# Patient Record
Sex: MALE | Race: WHITE | NOT HISPANIC OR LATINO | Employment: FULL TIME | URBAN - METROPOLITAN AREA
[De-identification: names, ages, dates, MRNs, and addresses within clinical notes are randomized per-mention and may not be internally consistent; named-entity substitution may affect disease eponyms.]

---

## 2017-02-20 ENCOUNTER — APPOINTMENT (OUTPATIENT)
Dept: PHYSICAL THERAPY | Facility: CLINIC | Age: 37
End: 2017-02-20
Payer: COMMERCIAL

## 2017-02-21 ENCOUNTER — APPOINTMENT (OUTPATIENT)
Dept: PHYSICAL THERAPY | Facility: CLINIC | Age: 37
End: 2017-02-21
Payer: COMMERCIAL

## 2017-02-21 PROCEDURE — 97140 MANUAL THERAPY 1/> REGIONS: CPT

## 2017-02-21 PROCEDURE — 97110 THERAPEUTIC EXERCISES: CPT

## 2017-02-21 PROCEDURE — 97161 PT EVAL LOW COMPLEX 20 MIN: CPT

## 2017-02-24 ENCOUNTER — APPOINTMENT (OUTPATIENT)
Dept: PHYSICAL THERAPY | Facility: CLINIC | Age: 37
End: 2017-02-24
Payer: COMMERCIAL

## 2017-02-27 ENCOUNTER — APPOINTMENT (OUTPATIENT)
Dept: PHYSICAL THERAPY | Facility: CLINIC | Age: 37
End: 2017-02-27
Payer: COMMERCIAL

## 2017-02-27 PROCEDURE — 97110 THERAPEUTIC EXERCISES: CPT

## 2017-02-27 PROCEDURE — 97140 MANUAL THERAPY 1/> REGIONS: CPT

## 2017-03-02 ENCOUNTER — APPOINTMENT (OUTPATIENT)
Dept: PHYSICAL THERAPY | Facility: CLINIC | Age: 37
End: 2017-03-02
Payer: COMMERCIAL

## 2017-03-02 PROCEDURE — 97140 MANUAL THERAPY 1/> REGIONS: CPT

## 2017-03-02 PROCEDURE — 97110 THERAPEUTIC EXERCISES: CPT

## 2017-03-07 ENCOUNTER — APPOINTMENT (OUTPATIENT)
Dept: PHYSICAL THERAPY | Facility: CLINIC | Age: 37
End: 2017-03-07
Payer: COMMERCIAL

## 2017-03-07 PROCEDURE — 97110 THERAPEUTIC EXERCISES: CPT

## 2017-03-07 PROCEDURE — 97140 MANUAL THERAPY 1/> REGIONS: CPT

## 2017-03-09 ENCOUNTER — APPOINTMENT (OUTPATIENT)
Dept: PHYSICAL THERAPY | Facility: CLINIC | Age: 37
End: 2017-03-09
Payer: COMMERCIAL

## 2017-03-09 PROCEDURE — 97110 THERAPEUTIC EXERCISES: CPT

## 2017-03-09 PROCEDURE — 97140 MANUAL THERAPY 1/> REGIONS: CPT

## 2017-03-14 ENCOUNTER — APPOINTMENT (OUTPATIENT)
Dept: PHYSICAL THERAPY | Facility: CLINIC | Age: 37
End: 2017-03-14
Payer: COMMERCIAL

## 2017-03-16 ENCOUNTER — GENERIC CONVERSION - ENCOUNTER (OUTPATIENT)
Dept: OTHER | Facility: OTHER | Age: 37
End: 2017-03-16

## 2017-03-16 ENCOUNTER — APPOINTMENT (OUTPATIENT)
Dept: PHYSICAL THERAPY | Facility: CLINIC | Age: 37
End: 2017-03-16
Payer: COMMERCIAL

## 2017-03-16 PROCEDURE — 97140 MANUAL THERAPY 1/> REGIONS: CPT

## 2017-03-16 PROCEDURE — 97110 THERAPEUTIC EXERCISES: CPT

## 2017-03-21 ENCOUNTER — APPOINTMENT (OUTPATIENT)
Dept: PHYSICAL THERAPY | Facility: CLINIC | Age: 37
End: 2017-03-21
Payer: COMMERCIAL

## 2017-03-21 PROCEDURE — 97110 THERAPEUTIC EXERCISES: CPT

## 2017-03-21 PROCEDURE — 97140 MANUAL THERAPY 1/> REGIONS: CPT

## 2017-03-21 PROCEDURE — 97010 HOT OR COLD PACKS THERAPY: CPT

## 2017-03-23 ENCOUNTER — APPOINTMENT (OUTPATIENT)
Dept: PHYSICAL THERAPY | Facility: CLINIC | Age: 37
End: 2017-03-23
Payer: COMMERCIAL

## 2017-03-23 PROCEDURE — 97140 MANUAL THERAPY 1/> REGIONS: CPT

## 2017-03-23 PROCEDURE — 97110 THERAPEUTIC EXERCISES: CPT

## 2017-03-28 ENCOUNTER — APPOINTMENT (OUTPATIENT)
Dept: PHYSICAL THERAPY | Facility: CLINIC | Age: 37
End: 2017-03-28
Payer: COMMERCIAL

## 2017-03-28 PROCEDURE — 97140 MANUAL THERAPY 1/> REGIONS: CPT

## 2017-03-28 PROCEDURE — 97110 THERAPEUTIC EXERCISES: CPT

## 2017-03-30 ENCOUNTER — APPOINTMENT (OUTPATIENT)
Dept: PHYSICAL THERAPY | Facility: CLINIC | Age: 37
End: 2017-03-30
Payer: COMMERCIAL

## 2017-04-21 ENCOUNTER — ALLSCRIPTS OFFICE VISIT (OUTPATIENT)
Dept: OTHER | Facility: OTHER | Age: 37
End: 2017-04-21

## 2017-04-21 DIAGNOSIS — G57.82 OTHER SPECIFIED MONONEUROPATHIES OF LEFT LOWER LIMB: ICD-10-CM

## 2018-01-15 NOTE — PROGRESS NOTES
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)    Discussion/Summary    Continue home stretches indefinitely especially before and after running  Chief Complaint  Consult from Neurosurgery for left leg pain  9/22 see all subsequent HPIs for further subjective info      History of Present Illness  29 yo male with chronic, waxing and waning pain left medial knee/thigh to below knee  Present for several years   no known trauma   seen by Hilbert Boast and went to PT without benefit   has also tried chiropractor w/o lasting benefit  Noted mostly at night or when recumbent  Does have some LBP, left sciatic notch area pain  Sharp at times, Running has no effect   warm shower helps  Last night sat for three hours watching TV w/o pain  No bowel or bladder Sx  No N/T in toes  Had x-rays   never an MRI    9/22 in PT here, seem to be improving, no pain, transient numbness a few weeks ago      Review of Systems    Other Symptoms: no changes  ROS reviewed  Active Problems    1  Ankle contracture, left (718 47) (M24 572)   2  Left leg pain (729 5) (M79 605)   3  Lower back pain (724 2) (M54 5)   4  Other intervertebral disc degeneration, lumbar region (722 52) (M51 36)   5  Saphenous nerve neuropathy, 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)   6  Unequal leg length (acquired) (736 81) (M21 70)   7  URTI (acute upper respiratory infection) (465 9) (J06 9)    Past Medical History    1  History of Onychomycosis of toenail (110 1) (B35 1)    The active problems and past medical history were reviewed and updated today  Surgical History    The surgical history was reviewed and updated today  Family History  Mother    1  Family history of pancreatic cancer (V16 0) (Z80 0)  Father    2  Family history of aortic valve disorder (V17 49) (Z82 49)  Family History    3  Family history of hypertension (V17 49) (Z82 49)    The family history was reviewed and updated today         Social History    · [employer] graduate   · Denied: History of Drug use   · Lives with spouse   · Never a smoker   · Occasional alcohol use  The social history was reviewed and is unchanged  Current Meds   1  Advil 200 MG Oral Capsule; Therapy: (Recorded:51Tup7068) to Recorded    The medication list was reviewed and updated today  Allergies    1  Penicillins   2  sulfa    Vitals  Signs   Recorded: 83LVP0407 53:65YO   Systolic: 164, LUE, Sitting  Diastolic: 70, LUE, Sitting  Heart Rate: 80, L Radial  Pulse Quality: Regular, L Radial  Respiration Quality: Normal  Respiration: 16  Height: 6 ft   Weight: 211 lb 6 oz  BMI Calculated: 28 67  BSA Calculated: 2 18    Physical Exam        Lumbar/Sacral Spine examination demonstrates Lumbosacral Spine:   Evaluation of Muscle Stretch Reflexes on the right side demonstrates 2/4 Hamstring Reflex, 2/4 Knee Jerk Reflex, 2/4 Ankle Jerk Reflex, negative Wilhelm Test right upper extremity and negative right ankle clonus  Evaluation of Muscle Stretch Reflexes on the left side demonstrates negative left ankle clonus, but 2/4 Hamstring Reflex, 2/4 Knee Jerk Reflex, 2/4 Ankle Jerk Reflex and negative Wilhelm Test left upper extremity  Special Tests: negative Straight Leg Raise on right and negative Straight Leg Raise on left  Results/Data    Diagnostic Review PT re-eval from 9/20 noted        Signatures   Electronically signed by : Mike Klein DO; Sep 22 2016  4:29PM EST                       (Author)

## 2018-01-16 NOTE — RESULT NOTES
Verified Results  * CT LEG LENGTH EVALUATION (SCANOGRAM) 12Aug2016 06:10AM Sulma Tavarez    Order Number: [id number]    Order Number: [id number]     Test Name Result Flag Reference   CT LEG LENGTH EVALUATION (SCANOGRAM) (Report)     LEG LENGTH STUDY     INDICATION: Unequal leg length  Stabbing pain in the left thigh when laying down  Evaluate leg lengths  VIEWS: A noncontrast supine CT scanogram was performed  COMPARISON: None  FINDINGS:       Bone length measurements are as follows:     Right femur from femoral head to medial femoral condyle = 49 5 cm      Left femur from femoral head to medial femoral condyle = 49 3 cm     Right tibia from medial tibial plateau the tibial plafond = 38 9 cm      Left tibia from medial tibial plateau the tibial plafond = 39 1 cm  Total right leg length including knee joint space from femoral head to tibial plafond = 89 1 cm  Total left leg length including knee joint space from femoral head to tibial plafond = 88 6 cm  IMPRESSION:     1  Leg length discrepancy measurements as above  Workstation performed: [id number]     Signed by:   Muna Parker MD   8/12/16     * MRI LUMBAR SPINE WO CONTRAST [id number] 06:08AM Anjali Dominguez Order Number: [id number]   Performing Comments: Has had out pt PT extensively and does self directed home program, had chiropractor    - Patient Instructions: To schedule this appointment, please contact Central Scheduling at [phone]   Order Number: [id number]   Performing Comments: Has had out pt PT extensively and does self directed home program, had chiropractor    - Patient Instructions: To be done on Friday, July 22, 6;30 a m  at [address]  1   Please arrive 15 minutes early to register  2   PLEASE BRING THIS ORDER WITH YOU, along wtih your insurance cards, photo ID and list of medications  3   No metal or jewelry       Test Name Result Flag Reference   MRI LUMBAR SPINE WO CONTRAST (Report)     This is a summary report  The complete report is available in the patient's medical record  If you cannot access the medical record, please contact the sending organization for a detailed fax or copy  MRI LUMBAR SPINE WITHOUT CONTRAST     INDICATION: Low back pain with left leg radiculopathy  COMPARISON: None  TECHNIQUE: Sagittal T1, sagittal T2, sagittal inversion recovery, axial T1 and axial T2, coronal T2       IMAGE QUALITY: Diagnostic     FINDINGS:     OSSEOUS STRUCTURES: Normal alignment of the lumbar spine  No spondylolysis or spondylolisthesis  No scoliosis  There is no compression fracture  Type II endplate marrow degenerative change involving the L5-S1 endplates  SACRUM: Normal signal within the sacrum  No evidence of insufficiency or stress fracture  DISTAL CORD AND CONUS: Normal size and signal within the distal cord and conus  The conus ends at the L1 level  PARASPINAL SOFT TISSUES: Paraspinal soft tissues are unremarkable  LOWER THORACIC DISC SPACES: Normal disc height and signal  No disc herniation, canal stenosis or foraminal narrowing  LUMBAR DISC SPACES:        L1-L2: Normal      L2-L3: Normal      L3-L4: Normal disc height and signal  Mild foraminal narrowing which may be related to congenital short pedicles  No discrete foraminal nerve impingement  L4-L5: Normal disc height and signal  No disc herniation, canal stenosis or foraminal narrowing  L5-S1: Disc desiccation and loss of disc height  Mild diffuse annular bulging without focal disc herniation  No canal stenosis  IMPRESSION:     No disc herniation or canal stenosis  Mild foraminal narrowing without discrete nerve impingement at the L3-4 level         Workstation performed: [id number]     Signed by:   Ruben Phan DO   8/12/16

## 2018-01-16 NOTE — PROGRESS NOTES
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)    Plan  Saphenous nerve neuropathy, left    · * MRI FEMUR RIGHT W WO CONTRAST; Status:Need Information - Financial  Authorization; Requested for:21Apr2017;    · Follow Up After Tests Complete Evaluation and Treatment  Follow-up  Status: Hold For -  Scheduling  Requested for: 21Apr2017    Discussion/Summary    MRI left upper leg  Follow up after MRI  Chief Complaint  Consult from Neurosurgery for left leg pain  4/21 self scheduled follow up  History of Present Illness  31 yo male with chronic, waxing and waning pain left medial knee/thigh to below knee  Present for several years   no known trauma   seen by Sammie Wright and went to PT without benefit   has also tried chiropractor w/o lasting benefit  Noted mostly at night or when recumbent  Does have some LBP, left sciatic notch area pain  Sharp at times, Running has no effect   warm shower helps  Last night sat for three hours watching TV w/o pain  No bowel or bladder Sx  No N/T in toes  Had x-rays   never an MRI    4/21 last Seen Sept 2016 had been doing well  Had completely resolved   lasted for a few months   now returned, pain and sensory disturbance on left medial thigh, NOTHING below knee  No back pain or radicular Sx  Review of Systems    Gastrointestinal: No complaints of abdominal pain, no constipation, no nausea or vomiting, no diarrhea or bloody stools  Genitourinary: No complaints of dysuria or incontinence, no hesitancy, no nocturia  Musculoskeletal: as noted in HPI  Neurological: no numbness and no tingling  Active Problems    1  Ankle contracture, left (718 47) (M24 572)   2  Bronchitis (490) (J40)   3  Heart burn (787 1) (R12)   4  Influenza (487 1) (J11 1)   5  Left leg pain (729 5) (M79 605)   6  Lower back pain (724 2) (M54 5)   7  Nausea (787 02) (R11 0)   8  Other intervertebral disc degeneration, lumbar region (722 52) (M51 36)   9   Saphenous nerve neuropathy, 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)   10  Unequal leg length (acquired) (736 81) (M21 70)   11  URTI (acute upper respiratory infection) (465 9) (J06 9)    Past Medical History    1  History of Onychomycosis of toenail (110 1) (B35 1)   2  History of URTI (acute upper respiratory infection) (465 9) (J06 9)    The active problems and past medical history were reviewed and updated today  Surgical History    The surgical history was reviewed and updated today  Family History  Mother    1  Family history of pancreatic cancer (V16 0) (Z80 0)  Father    2  Family history of aortic valve disorder (V17 49) (Z82 49)  Family History    3  Family history of hypertension (V17 49) (Z82 49)    Social History    · College graduate   · Denied: History of Drug use   · Lives with spouse   · Never a smoker   · Occasional alcohol use    Current Meds   1  Advil 200 MG Oral Capsule; Therapy: (Recorded:60Kxm7718) to Recorded   2  Benzonatate 100 MG Oral Capsule; 1-2 pills every eight hours for cough; Therapy: 07NLT3744 to (Evaluate:25Jan2017)  Requested for: 87OYH5227; Last   Rx:05Jan2017 Ordered   3  Pantoprazole Sodium 40 MG Oral Tablet Delayed Release; TAKE 1 TABLET Daily prior to   breakfast daily; Therapy: 16YCO4743 to (Evaluate:68Ivt6554)  Requested for: 68IIY3935; Last   Rx:04Jan2017 Ordered   4  Prochlorperazine Maleate 10 MG Oral Tablet; TAKE 1 TABLET EVERY 6 HOURS AS   NEEDED FOR NAUSEA; Therapy: 32XQU8713 to (Evaluate:10Jan2017)  Requested for: 24URV1419; Last   Rx:04Jan2017 Ordered   5  Zithromax Z-Brody 250 MG Oral Tablet (Azithromycin); TAKE AS DIRECTED; Therapy: 81DGB9417 to (Evaluate:10Jan2017)  Requested for: 91TRB0128; Last   Rx:05Jan2017 Ordered    Allergies    1   Penicillins   2  sulfa    Vitals  Signs   Recorded: 21Apr2017 02:59PM   Heart Rate: 84  Systolic: 815  Diastolic: 90  Height: 6 ft   Weight: 225 lb 6 08 oz  BMI Calculated: 30 57  BSA Calculated: 2 24    Results/Data    MRI Review L spine is normal       Signatures   Electronically signed by : Blanca Maravlila DO;  Apr 21 2017  3:19PM EST                       (Author)

## 2018-01-18 NOTE — PROGRESS NOTES
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)    Plan  Saphenous nerve neuropathy, left    · *1 - [facility]  Consult [facility] MFR nerve  glides for intermittent left Saphenous entrpment in thigh  Status: Hold For - Scheduling   Requested for: 96IMY2131  Care Summary provided  : Yes   · Follow-up Visit in 4 Weeks Evaluation and Treatment  Follow-up  Status: Hold For -  Scheduling  Requested for: 56Ssv9685    Discussion/Summary    Nerve pinching seems to be more in the leg than from the spine  Chief Complaint  Consult from Neurosurgery for left leg pain  History of Present Illness  29 yo male with chronic, waxing and waning pain left medial knee/thigh to below knee  Present for several years   no known trauma   seen by Kadi Ness and went to PT without benefit   has also tried chiropractor w/o lasting benefit  Noted mostly at night or when recumbent  Does have some LBP, left sciatic notch area pain  Sharp at times, Running has no effect   warm shower helps  Last night sat for three hours watching TV w/o pain  No bowel or bladder Sx  No N/T in toes  Had x-rays   never an MRI  8/18 pattern unchanged left medial knee to no lower than calf   NOT toes  Sx intermittent, runs w/o problems, then can occur with sitting  Review of Systems    Musculoskeletal: as noted in HPI  Active Problems    1  Ankle contracture, left (718 47) (M24 572)   2  Left leg pain (729 5) (M79 605)   3  Lower back pain (724 2) (M54 5)   4  Other intervertebral disc degeneration, lumbar region (722 52) (M51 36)   5  Unequal leg length (acquired) (736 81) (M21 70)   6  URTI (acute upper respiratory infection) (465 9) (J06 9)    Past Medical History    1  History of Onychomycosis of toenail (110 1) (B35 1)    The active problems and past medical history were reviewed and updated today  Surgical History    The surgical history was reviewed and updated today  Family History  Mother    1  Family history of pancreatic cancer (V16 0) (Z80 0)  Father    2  Family history of aortic valve disorder (V17 49) (Z82 49)  Family History    3  Family history of hypertension (V17 49) (Z82 49)    Social History    · College graduate   · Denied: History of Drug use   · Lives with spouse   · Never a smoker   · Occasional alcohol use    Current Meds   1  Advil 200 MG Oral Capsule; Therapy: (Recorded:14Wxp3590) to Recorded    The medication list was reviewed and updated today  Allergies    1  Penicillins   2  sulfa    Vitals  Signs   Recorded: 71ANY8852 26:73YB   Systolic: 647  Diastolic: 82  Heart Rate: 80  Weight: 214 lb 6 oz  BMI Calculated: 29 07  BSA Calculated: 2 19    Physical Exam    Constitutional   General appearance: Well developed, well nourished, alert, in no distress, non-toxic and no overt pain behavior  Lumbar/Sacral Spine examination demonstrates  neurologically intact, ++ Tinel'sover Saphenous nerve at medial knees joint  Results/Data  I personally reviewed the films/images/results in the office today  My interpretation follows  MRI Review L- 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

## 2018-01-22 VITALS
HEIGHT: 72 IN | WEIGHT: 225.38 LBS | HEART RATE: 84 BPM | BODY MASS INDEX: 30.53 KG/M2 | SYSTOLIC BLOOD PRESSURE: 142 MMHG | DIASTOLIC BLOOD PRESSURE: 90 MMHG

## 2018-03-13 ENCOUNTER — TELEPHONE (OUTPATIENT)
Dept: INTERNAL MEDICINE CLINIC | Facility: CLINIC | Age: 38
End: 2018-03-13

## 2018-03-13 DIAGNOSIS — E78.5 HYPERLIPIDEMIA, UNSPECIFIED HYPERLIPIDEMIA TYPE: Primary | ICD-10-CM

## 2018-03-13 DIAGNOSIS — R53.83 FATIGUE, UNSPECIFIED TYPE: ICD-10-CM

## 2018-03-13 NOTE — TELEPHONE ENCOUNTER
Pt called to schedule a physical on 4/11  Would you please order the required Bw  He will probably end up going to [facility] to get it done  I will call him at [phone] once the labs are in  Thank you

## 2018-03-21 ENCOUNTER — APPOINTMENT (OUTPATIENT)
Dept: LAB | Facility: CLINIC | Age: 38
End: 2018-03-21
Payer: COMMERCIAL

## 2018-03-21 ENCOUNTER — TRANSCRIBE ORDERS (OUTPATIENT)
Dept: LAB | Facility: CLINIC | Age: 38
End: 2018-03-21

## 2018-03-21 DIAGNOSIS — R53.83 FATIGUE, UNSPECIFIED TYPE: ICD-10-CM

## 2018-03-21 DIAGNOSIS — E78.5 HYPERLIPIDEMIA, UNSPECIFIED HYPERLIPIDEMIA TYPE: ICD-10-CM

## 2018-03-21 LAB
ALBUMIN SERPL BCP-MCNC: 4.1 G/DL (ref 3.5–5)
ALP SERPL-CCNC: 76 U/L (ref 46–116)
ALT SERPL W P-5'-P-CCNC: 49 U/L (ref 12–78)
ANION GAP SERPL CALCULATED.3IONS-SCNC: 6 MMOL/L (ref 4–13)
AST SERPL W P-5'-P-CCNC: 27 U/L (ref 5–45)
BILIRUB SERPL-MCNC: 0.6 MG/DL (ref 0.2–1)
BUN SERPL-MCNC: 15 MG/DL (ref 5–25)
CALCIUM SERPL-MCNC: 9.4 MG/DL (ref 8.3–10.1)
CHLORIDE SERPL-SCNC: 103 MMOL/L (ref 100–108)
CHOLEST SERPL-MCNC: 175 MG/DL (ref 50–200)
CO2 SERPL-SCNC: 33 MMOL/L (ref 21–32)
CREAT SERPL-MCNC: 1.3 MG/DL (ref 0.6–1.3)
GFR SERPL CREATININE-BSD FRML MDRD: 70 ML/MIN/1.73SQ M
GLUCOSE P FAST SERPL-MCNC: 93 MG/DL (ref 65–99)
HDLC SERPL-MCNC: 45 MG/DL (ref 40–60)
LDLC SERPL CALC-MCNC: 107 MG/DL (ref 0–100)
POTASSIUM SERPL-SCNC: 4 MMOL/L (ref 3.5–5.3)
PROT SERPL-MCNC: 7.6 G/DL (ref 6.4–8.2)
SODIUM SERPL-SCNC: 142 MMOL/L (ref 136–145)
TRIGL SERPL-MCNC: 114 MG/DL

## 2018-03-21 PROCEDURE — 80053 COMPREHEN METABOLIC PANEL: CPT

## 2018-03-21 PROCEDURE — 36415 COLL VENOUS BLD VENIPUNCTURE: CPT

## 2018-03-21 PROCEDURE — 80061 LIPID PANEL: CPT

## 2018-03-26 ENCOUNTER — OFFICE VISIT (OUTPATIENT)
Dept: INTERNAL MEDICINE CLINIC | Facility: CLINIC | Age: 38
End: 2018-03-26

## 2018-03-26 DIAGNOSIS — M62.838 MUSCLE SPASM OF RIGHT LEG: Primary | ICD-10-CM

## 2018-03-26 PROCEDURE — 99213 OFFICE O/P EST LOW 20 MIN: CPT | Performed by: NURSE PRACTITIONER

## 2018-03-26 NOTE — ASSESSMENT & PLAN NOTE
Pain appears to be musculoskeletal in nature, possible muscle spasm and or muscle strain  I suggested NSAIDS and application of heat and I prescribed flexeril x 10 days  We discussed ways he can begin with gentle stretching as tolerated  I explained that if his symptoms do not resolve he will need additional follow up  We discussed possible need for PT as well as injury prevention while running

## 2018-03-26 NOTE — PATIENT INSTRUCTIONS
Muscle Spasm   WHAT YOU NEED TO KNOW:   A muscle spasm is a sudden contraction of any muscle or group of muscles  A muscle cramp is a painful muscle spasm  Muscle cramps commonly occur after intense exercise or during pregnancy  They may also be caused by certain medications, dehydration, low calcium or magnesium levels, or another medical condition  DISCHARGE INSTRUCTIONS:   Medicines: You may need the following:  · NSAIDs  help decrease swelling and pain or fever  This medicine is available with or without a doctor's order  NSAIDs can cause stomach bleeding or kidney problems in certain people  If you take blood thinner medicine, always ask your healthcare provider if NSAIDs are safe for you  Always read the medicine label and follow directions  · Take your medicine as directed  Contact your healthcare provider if you think your medicine is not helping or if you have side effects  Tell him of her if you are allergic to any medicine  Keep a list of the medicines, vitamins, and herbs you take  Include the amounts, and when and why you take them  Bring the list or the pill bottles to follow-up visits  Carry your medicine list with you in case of an emergency  Follow up with your healthcare provider as directed: You may need other tests or treatment  You may also be referred to a physical therapist or other specialist  Write down your questions so you remember to ask them during your visits  Self-care:   · Stretch  your muscle to help relieve the cramp  It may be helpful to keep your muscle in the stretched position until the cramp is gone  · Apply heat  to help decrease pain and muscle spasms  Apply heat on the area for 20 to 30 minutes every 2 hours for as many days as directed  · Apply ice  to help decrease swelling and pain  Ice may also help prevent tissue damage  Use an ice pack, or put crushed ice in a plastic bag   Cover it with a towel and place it on your muscle for 15 to 20 minutes every hour or as directed  · Drink more liquids  to help prevent muscle cramps caused by dehydration  Sports drinks may help replace electrolytes you lose through sweat during exercise  Ask your healthcare provider how much liquid to drink each day and which liquids are best for you  · Eat healthy foods , such as fruits, vegetables, whole grains, low-fat dairy products, and lean proteins (meat, beans, and fish)  If you are pregnant, ask your healthcare provider about foods that are high in magnesium and sodium  They may help to relieve cramps during pregnancy  · Massage your muscle  to help relieve the cramp  · Take frequent deep breaths  until the cramp feels better  Lie down while you take the deep breaths so you do not get dizzy or lightheaded  Contact your healthcare provider if:   · You have signs of dehydration, such as a headache, dark yellow urine, dry eyes or mouth, or a fast heartbeat  · You have questions or concerns about your condition or care  Return to the emergency department if:   · You have warmth, swelling, or redness in the cramping muscle  · You have frequent or unrelieved muscle cramps in several different muscles  · You have muscle cramps with numbness, tingling, and burning in your hands and feet  © 2017 [address] Information is for End User's use only and may not be sold, redistributed or otherwise used for commercial purposes  All illustrations and images included in CareNotes® are the copyrighted property of [employer] , [employer]  or Addy Acuña  The above information is an  only  It is not intended as medical advice for individual conditions or treatments  Talk to your doctor, nurse or pharmacist before following any medical regimen to see if it is safe and effective for you

## 2018-03-26 NOTE — PROGRESS NOTES
Assessment/Plan:    Muscle spasm of right leg  Pain appears to be musculoskeletal in nature, possible muscle spasm and or muscle strain  I suggested NSAIDS and application of heat and I prescribed flexeril x 10 days  We discussed ways he can begin with gentle stretching as tolerated  I explained that if his symptoms do not resolve he will need additional follow up  We discussed possible need for PT as well as injury prevention while running  Diagnoses and all orders for this visit:    Muscle spasm of right leg          Subjective:      Patient ID: Isaac Alvarez is a 40 y o  male  Pt is a 40 y o  y/o male who is seen today for evaluation of a 1 week history of R thigh pain  Pt reports a localized "tightness" on the posterior/lateral R thigh which is worsened with weightbearing and straightening of his leg  Pt states that prior to start of symptoms he ran two miles  He does run for exercise, however he had not run in recent weeks  Pain started initially in his lower back while on the floor playing with his daughter; the back pain is no longer present and symptoms are very localized to the muscles of the thigh  There is no numbness or tingling, no radiation from the hip down to the lower leg  He did try taking a pain pill that was prescribed to his wife, he does not remember the name of this medication but says it did not relieve his symptoms  The following portions of the patient's history were reviewed and updated as appropriate: allergies, current medications, past family history, past medical history, past social history, past surgical history and problem list     Review of Systems   Constitutional: Positive for activity change  Negative for appetite change, chills, fatigue and fever  Respiratory: Negative for shortness of breath  Cardiovascular: Negative for chest pain, palpitations and leg swelling  Musculoskeletal: Positive for myalgias   Negative for arthralgias, back pain, joint swelling and neck pain  Skin: Negative for color change, rash and wound  Neurological: Negative for weakness and numbness  Objective: There were no vitals taken for this visit  Physical Exam   Constitutional: He is oriented to person, place, and time  He appears well-developed and well-nourished  He is cooperative  Neck: Normal range of motion  Cardiovascular: Normal rate, regular rhythm and intact distal pulses  Pulmonary/Chest: Effort normal    Musculoskeletal: Normal range of motion  He exhibits no edema  Right hip: He exhibits tenderness  Right upper leg: He exhibits tenderness  There is significant tightness noted on exam on the R lateral thigh, upper- mid shaft and tenderness to palpation over this same area as well as posteriorly at the upper thigh  Pain is worst when leg is straightened in a supine position regardless of leg height  Flexion of knee relieves pain, rotation of hip is asymptomatic  Neurological: He is alert and oriented to person, place, and time  He has normal strength and normal reflexes  No sensory deficit  He exhibits normal muscle tone  Strength is normal, but pain limits motion   Skin: Skin is warm and dry  No rash noted  No erythema  Psychiatric: He has a normal mood and affect   His speech is normal and behavior is normal  Judgment and thought content normal  Cognition and memory are normal

## 2018-04-11 ENCOUNTER — OFFICE VISIT (OUTPATIENT)
Dept: INTERNAL MEDICINE CLINIC | Facility: CLINIC | Age: 38
End: 2018-04-11
Payer: COMMERCIAL

## 2018-04-11 VITALS
HEART RATE: 83 BPM | TEMPERATURE: 97.7 F | DIASTOLIC BLOOD PRESSURE: 82 MMHG | OXYGEN SATURATION: 99 % | WEIGHT: 220.4 LBS | RESPIRATION RATE: 12 BRPM | HEIGHT: 71 IN | SYSTOLIC BLOOD PRESSURE: 132 MMHG | BODY MASS INDEX: 30.85 KG/M2

## 2018-04-11 DIAGNOSIS — R12 HEART BURN: ICD-10-CM

## 2018-04-11 DIAGNOSIS — R68.82 LIBIDO, DECREASED: ICD-10-CM

## 2018-04-11 DIAGNOSIS — E78.5 HYPERLIPIDEMIA, UNSPECIFIED HYPERLIPIDEMIA TYPE: Primary | ICD-10-CM

## 2018-04-11 DIAGNOSIS — E66.9 OBESITY (BMI 30-39.9): ICD-10-CM

## 2018-04-11 PROBLEM — M62.838 MUSCLE SPASM OF RIGHT LEG: Status: RESOLVED | Noted: 2018-03-26 | Resolved: 2018-04-11

## 2018-04-11 PROCEDURE — 99395 PREV VISIT EST AGE 18-39: CPT | Performed by: INTERNAL MEDICINE

## 2018-04-11 RX ORDER — DIPHENOXYLATE HYDROCHLORIDE AND ATROPINE SULFATE 2.5; .025 MG/1; MG/1
1 TABLET ORAL DAILY
COMMUNITY

## 2018-04-11 RX ORDER — UBIDECARENONE 75 MG
CAPSULE ORAL DAILY
COMMUNITY

## 2018-04-11 NOTE — ASSESSMENT & PLAN NOTE
Prior history of heartburn symptoms which now are much better  He indicates that he does on a very rare occasion get some heartburn but is far less often than it use to be

## 2018-04-11 NOTE — PROGRESS NOTES
Assessment/Plan:    Hyperlipidemia  Reviewed with the patient his lipid profile which shows of mild elevation in his LDL with normal total cholesterol HDL and triglyceride values  I reviewed with the patient dietary adjustments which would be beneficial to help reduce his LDL burden  In addition I have recommended that he work on reducing his weight and start a regular exercise program   Would like to review his lipid profile in 1 year  Heart burn  Prior history of heartburn symptoms which now are much better  He indicates that he does on a very rare occasion get some heartburn but is far less often than it use to be  Libido, decreased  Patient inquired about possible causes for decreased libido  I indicated I think most likely the reason for his decreased libido his due to his very busy work schedule and a lack of time for relaxation  He is able to obtain erections without any problems  Patient was reassured that does not appear to be any physiologic problems  Obesity (BMI 30-39  9)  Body mass index today is 30 74  Reviewed with the patient the benefits of weight reduction  I have asked him to decrease his consumption of fried foods as well as maintain a regular exercise routine with exercise at least 4 days a week  Diagnoses and all orders for this visit:    Hyperlipidemia, unspecified hyperlipidemia type  -     Comprehensive metabolic panel; Future  -     Lipid panel; Future    Libido, decreased    Heart burn    Other orders  -     multivitamin (THERAGRAN) TABS; Take 1 tablet by mouth daily  -     cyanocobalamin (VITAMIN B-12) 100 mcg tablet; Take by mouth daily        Subjective:      Patient ID: Eber Noguera is a 40 y o  male  This is a routine annual health maintenance checkup for this 64-year-old gentleman  He presents today for physical exam and review of routine blood work    He was recently seen in our office for discomfort in his right leg treated with a muscle relaxer which he responded well to  His symptoms of discomfort and spasm have resolved completely  The patient indicates that he has started exercising on a regular basis recently is very busy at work and has had a decrease in his sexual libido over the past year  The following portions of the patient's history were reviewed and updated as appropriate:   He  has a past medical history of Onychomycosis of toenail  He   Patient Active Problem List    Diagnosis Date Noted    Hyperlipidemia 04/11/2018    Libido, decreased 04/11/2018    Obesity (BMI 30-39 9) 04/11/2018    Heart burn 01/04/2017     He  has no past surgical history on file  His family history includes Hypertension in his family; Other in his father; Pancreatic cancer in his mother  He  reports that he has never smoked  He has never used smokeless tobacco  He reports that he drinks alcohol  He reports that he does not use drugs       Review of Systems   All other systems reviewed and are negative  Objective:      /82 (BP Location: Left arm, Patient Position: Sitting, Cuff Size: Adult)   Pulse 83   Temp 97 7 °F (36 5 °C) (Tympanic)   Resp 12   Ht 5' 11" (1 803 m)   Wt 100 kg (220 lb 6 4 oz)   SpO2 99%   BMI 30 74 kg/m²          Physical Exam   Constitutional: He is oriented to person, place, and time  He appears well-developed and well-nourished  No distress  HENT:   Head: Normocephalic and atraumatic  Right Ear: Hearing, tympanic membrane, external ear and ear canal normal    Left Ear: Hearing, tympanic membrane, external ear and ear canal normal    Nose: Nose normal    Mouth/Throat: Oropharynx is clear and moist and mucous membranes are normal  No oropharyngeal exudate  Eyes: Conjunctivae are normal  Pupils are equal, round, and reactive to light  Right eye exhibits no discharge  Left eye exhibits no discharge  No scleral icterus  Neck: No JVD present  No tracheal deviation present  No thyromegaly present  Cardiovascular: Normal rate, regular rhythm, S1 normal, S2 normal, normal heart sounds and intact distal pulses  Exam reveals no gallop and no friction rub  No murmur heard  Pulmonary/Chest: Effort normal and breath sounds normal  No respiratory distress  He has no wheezes  He has no rales  He exhibits no tenderness  Abdominal: Soft  Bowel sounds are normal  He exhibits no distension and no mass  There is no tenderness  There is no rebound and no guarding  Musculoskeletal: Normal range of motion  He exhibits no edema  Lymphadenopathy:     He has no cervical adenopathy  Neurological: He is alert and oriented to person, place, and time  He has normal reflexes  He displays normal reflexes  No cranial nerve deficit  He exhibits normal muscle tone  Coordination normal    Skin: Skin is warm and dry  No rash noted  He is not diaphoretic  No erythema  No pallor  Psychiatric: He has a normal mood and affect   His behavior is normal  Judgment and thought content normal

## 2018-04-11 NOTE — ASSESSMENT & PLAN NOTE
Reviewed with the patient his lipid profile which shows of mild elevation in his LDL with normal total cholesterol HDL and triglyceride values  I reviewed with the patient dietary adjustments which would be beneficial to help reduce his LDL burden  In addition I have recommended that he work on reducing his weight and start a regular exercise program   Would like to review his lipid profile in 1 year

## 2018-04-11 NOTE — ASSESSMENT & PLAN NOTE
Body mass index today is 30 74  Reviewed with the patient the benefits of weight reduction  I have asked him to decrease his consumption of fried foods as well as maintain a regular exercise routine with exercise at least 4 days a week

## 2018-04-11 NOTE — ASSESSMENT & PLAN NOTE
Patient inquired about possible causes for decreased libido  I indicated I think most likely the reason for his decreased libido his due to his very busy work schedule and a lack of time for relaxation  He is able to obtain erections without any problems  Patient was reassured that does not appear to be any physiologic problems

## 2018-07-03 ENCOUNTER — TELEPHONE (OUTPATIENT)
Dept: INTERNAL MEDICINE CLINIC | Facility: CLINIC | Age: 38
End: 2018-07-03

## 2018-07-03 DIAGNOSIS — J06.9 URTI (ACUTE UPPER RESPIRATORY INFECTION): Primary | ICD-10-CM

## 2018-07-03 RX ORDER — AZITHROMYCIN 250 MG/1
TABLET, FILM COATED ORAL
Qty: 6 TABLET | Refills: 0 | Status: SHIPPED | OUTPATIENT
Start: 2018-07-03 | End: 2018-07-07

## 2018-07-03 NOTE — PROGRESS NOTES
Call today's unable to come into the office for an examination he has symptoms of an upper respiratory tract infection including a sore throat and sinus congestion  Will school start him on a Zithromax pack to be taken as per the insert if his symptoms fail to improve would like to see him in the office for an assessment

## 2018-07-03 NOTE — TELEPHONE ENCOUNTER
Please let the patient know that we sent a prescription in to his pharmacy him pick it up at his convenience

## 2018-07-03 NOTE — TELEPHONE ENCOUNTER
Pt would like a prescription sent to [facility] for a right ear infection and sinus issues,no temp,no cough  Pt fanny be away for the next couple days  He can be reached at [phone]

## 2018-08-29 DIAGNOSIS — M62.838 MUSCLE SPASM OF RIGHT LEG: ICD-10-CM

## 2018-08-29 RX ORDER — CYCLOBENZAPRINE HCL 10 MG
10 TABLET ORAL 3 TIMES DAILY PRN
Qty: 30 TABLET | Refills: 0 | Status: SHIPPED | OUTPATIENT
Start: 2018-08-29 | End: 2019-04-23 | Stop reason: ALTCHOICE

## 2018-09-12 ENCOUNTER — TELEPHONE (OUTPATIENT)
Dept: INTERNAL MEDICINE CLINIC | Facility: CLINIC | Age: 38
End: 2018-09-12

## 2018-09-12 NOTE — TELEPHONE ENCOUNTER
Pt would like a call back regarding medication and leg pain  Dr Mary Gonsalez told him to call if he didn't have any relief,he can be reached at [phone]

## 2019-04-23 ENCOUNTER — OFFICE VISIT (OUTPATIENT)
Dept: INTERNAL MEDICINE CLINIC | Facility: CLINIC | Age: 39
End: 2019-04-23
Payer: COMMERCIAL

## 2019-04-23 VITALS
OXYGEN SATURATION: 98 % | RESPIRATION RATE: 14 BRPM | SYSTOLIC BLOOD PRESSURE: 124 MMHG | HEART RATE: 86 BPM | WEIGHT: 222.2 LBS | TEMPERATURE: 97 F | DIASTOLIC BLOOD PRESSURE: 80 MMHG | BODY MASS INDEX: 31.11 KG/M2 | HEIGHT: 71 IN

## 2019-04-23 DIAGNOSIS — R12 HEART BURN: ICD-10-CM

## 2019-04-23 DIAGNOSIS — M79.605 PAIN IN BOTH LOWER EXTREMITIES: Primary | ICD-10-CM

## 2019-04-23 DIAGNOSIS — Z13.220 SCREENING FOR HYPERLIPIDEMIA: ICD-10-CM

## 2019-04-23 DIAGNOSIS — E66.9 OBESITY (BMI 30-39.9): ICD-10-CM

## 2019-04-23 DIAGNOSIS — Z13.1 SCREENING FOR DIABETES MELLITUS: ICD-10-CM

## 2019-04-23 DIAGNOSIS — Z13.0 SCREENING FOR DEFICIENCY ANEMIA: ICD-10-CM

## 2019-04-23 DIAGNOSIS — M79.604 PAIN IN BOTH LOWER EXTREMITIES: Primary | ICD-10-CM

## 2019-04-23 PROCEDURE — 99395 PREV VISIT EST AGE 18-39: CPT | Performed by: INTERNAL MEDICINE

## 2019-06-25 ENCOUNTER — OFFICE VISIT (OUTPATIENT)
Dept: PODIATRY | Facility: CLINIC | Age: 39
End: 2019-06-25
Payer: COMMERCIAL

## 2019-06-25 VITALS
HEART RATE: 82 BPM | HEIGHT: 71 IN | BODY MASS INDEX: 28.28 KG/M2 | SYSTOLIC BLOOD PRESSURE: 121 MMHG | DIASTOLIC BLOOD PRESSURE: 78 MMHG | WEIGHT: 202 LBS

## 2019-06-25 DIAGNOSIS — L60.3 NAIL DYSTROPHY: Primary | ICD-10-CM

## 2019-06-25 PROCEDURE — NCFTCARE PR NON-COVERED FOOT CARE: Performed by: PODIATRIST

## 2019-09-03 ENCOUNTER — OFFICE VISIT (OUTPATIENT)
Dept: PODIATRY | Facility: CLINIC | Age: 39
End: 2019-09-03
Payer: COMMERCIAL

## 2019-09-03 ENCOUNTER — OFFICE VISIT (OUTPATIENT)
Dept: INTERNAL MEDICINE CLINIC | Facility: CLINIC | Age: 39
End: 2019-09-03
Payer: COMMERCIAL

## 2019-09-03 ENCOUNTER — TELEPHONE (OUTPATIENT)
Dept: OTHER | Facility: OTHER | Age: 39
End: 2019-09-03

## 2019-09-03 VITALS
DIASTOLIC BLOOD PRESSURE: 69 MMHG | HEART RATE: 75 BPM | WEIGHT: 233 LBS | SYSTOLIC BLOOD PRESSURE: 112 MMHG | BODY MASS INDEX: 32.62 KG/M2 | HEIGHT: 71 IN

## 2019-09-03 VITALS
HEIGHT: 71 IN | SYSTOLIC BLOOD PRESSURE: 122 MMHG | WEIGHT: 233 LBS | DIASTOLIC BLOOD PRESSURE: 86 MMHG | OXYGEN SATURATION: 96 % | BODY MASS INDEX: 32.62 KG/M2 | HEART RATE: 76 BPM

## 2019-09-03 DIAGNOSIS — M54.42 CHRONIC LEFT-SIDED LOW BACK PAIN WITH LEFT-SIDED SCIATICA: ICD-10-CM

## 2019-09-03 DIAGNOSIS — G89.29 CHRONIC LEFT-SIDED LOW BACK PAIN WITH LEFT-SIDED SCIATICA: ICD-10-CM

## 2019-09-03 DIAGNOSIS — H66.91 OTITIS OF RIGHT EAR: Primary | ICD-10-CM

## 2019-09-03 DIAGNOSIS — L60.3 NAIL DYSTROPHY: Primary | ICD-10-CM

## 2019-09-03 PROCEDURE — NCFTCARE PR NON-COVERED FOOT CARE: Performed by: PODIATRIST

## 2019-09-03 PROCEDURE — 99213 OFFICE O/P EST LOW 20 MIN: CPT | Performed by: INTERNAL MEDICINE

## 2019-09-03 RX ORDER — AZITHROMYCIN 250 MG/1
TABLET, FILM COATED ORAL
Qty: 6 TABLET | Refills: 0 | Status: SHIPPED | OUTPATIENT
Start: 2019-09-03 | End: 2019-09-07

## 2019-09-03 NOTE — PROGRESS NOTES
Patient presents for palliative foot care  No acute disorder noted  All toenails are extremely long  Treatment consisted of nail trimming

## 2019-09-03 NOTE — ASSESSMENT & PLAN NOTE
Otitis of the right ear with bulge in inflammation of the ear drum recommend the initiation of Zithromax 5 day pack  Proper dosing of the medication reviewed with the patient if symptoms persist or worsen he is encouraged to return to the office for follow-up evaluation

## 2019-09-03 NOTE — PROGRESS NOTES
Assessment/Plan:    Otitis of right ear  Otitis of the right ear with bulge in inflammation of the ear drum recommend the initiation of Zithromax 5 day pack  Proper dosing of the medication reviewed with the patient if symptoms persist or worsen he is encouraged to return to the office for follow-up evaluation  Chronic left-sided low back pain with left-sided sciatica  Chronic left-sided low back pain reviewed the patient's previous imaging of his spine  On today's examination the discomfort he has appears to be mostly muscular and an area of the left lower region of this back  There tends to be a muscle spasm with a not muscle that is palpable  His wife treats him by massaging this area which seems to relieve his discomfort  I have suggested that he have physical therapy for evaluation and treatment of this apparent muscle spasm condition perhaps he would also benefit from some stretching exercises  Diagnoses and all orders for this visit:    Otitis of right ear  -     azithromycin (ZITHROMAX) 250 mg tablet; Take 2 tablets today then 1 tablet daily x 4 days    Chronic left-sided low back pain with left-sided sciatica  -     Ambulatory referral to Physical Therapy; Future        Subjective:      Patient ID: Jennelle Rinne is a 41 y o  male  This is an acute visit for this 55-year-old gentleman  He presents today with 24 hours of right ear discomfort  He has had no drainage tinnitus fevers or chills  He denies any sinus congestion sore throat or chest congestion  He also has been having intermittent left lower back discomfort which improves when his wife applies pressure and massage to an area of the lower back  Examination of this area today indicates muscle spasm in the left lower back  The following portions of the patient's history were reviewed and updated as appropriate:   He  has a past medical history of Onychomycosis of toenail    He   Patient Active Problem List Diagnosis Date Noted    Otitis of right ear 09/03/2019    Chronic left-sided low back pain with left-sided sciatica 09/03/2019    Pain in both lower extremities 04/23/2019    Hyperlipidemia 04/11/2018    Libido, decreased 04/11/2018    Obesity (BMI 30-39 9) 04/11/2018    Heart burn 01/04/2017     He  has no past surgical history on file  His family history includes Hypertension in his family; Other in his father; Pancreatic cancer in his mother  He  reports that he has never smoked  He has never used smokeless tobacco  He reports that he drinks alcohol  He reports that he does not use drugs  Current Outpatient Medications   Medication Sig Dispense Refill    cyanocobalamin (VITAMIN B-12) 100 mcg tablet Take by mouth daily      multivitamin (THERAGRAN) TABS Take 1 tablet by mouth daily      azithromycin (ZITHROMAX) 250 mg tablet Take 2 tablets today then 1 tablet daily x 4 days 6 tablet 0     No current facility-administered medications for this visit       Review of Systems   HENT: Positive for ear pain  Musculoskeletal: Positive for back pain and myalgias  All other systems reviewed and are negative  Objective:      /86 (BP Location: Left arm, Patient Position: Sitting, Cuff Size: Adult)   Pulse 76   Ht 5' 11" (1 803 m)   Wt 106 kg (233 lb)   SpO2 96%   BMI 32 50 kg/m²          Physical Exam   Constitutional: He is oriented to person, place, and time  He appears well-developed and well-nourished  HENT:   Head: Normocephalic and atraumatic  Right Ear: Hearing, external ear and ear canal normal  Tympanic membrane is bulging  Left Ear: Hearing, tympanic membrane, external ear and ear canal normal    Nose: Mucosal edema and rhinorrhea present  Mouth/Throat: Oropharynx is clear and moist and mucous membranes are normal    Eyes: Pupils are equal, round, and reactive to light  Conjunctivae are normal    Neck: No thyromegaly present     Cardiovascular: Normal rate, regular rhythm, S1 normal, S2 normal, normal heart sounds and intact distal pulses  No murmur heard  Pulmonary/Chest: Effort normal and breath sounds normal    Abdominal: Soft  Bowel sounds are normal  There is no tenderness  Musculoskeletal: Normal range of motion  He exhibits no edema  Examination of the left lower back shows a area of muscle spasm with a non muscle in the right paravertebral musculature  Lymphadenopathy:     He has no cervical adenopathy  Neurological: He is alert and oriented to person, place, and time  He has normal reflexes  He displays normal reflexes  Skin: Skin is warm and dry  Psychiatric: He has a normal mood and affect   His behavior is normal  Judgment and thought content normal

## 2019-09-03 NOTE — ASSESSMENT & PLAN NOTE
Chronic left-sided low back pain reviewed the patient's previous imaging of his spine  On today's examination the discomfort he has appears to be mostly muscular and an area of the left lower region of this back  There tends to be a muscle spasm with a not muscle that is palpable  His wife treats him by massaging this area which seems to relieve his discomfort  I have suggested that he have physical therapy for evaluation and treatment of this apparent muscle spasm condition perhaps he would also benefit from some stretching exercises

## 2019-10-11 ENCOUNTER — TELEPHONE (OUTPATIENT)
Dept: INTERNAL MEDICINE CLINIC | Facility: CLINIC | Age: 39
End: 2019-10-11

## 2019-10-11 NOTE — TELEPHONE ENCOUNTER
Patient called with some discomfort in the testicle and epididymis area  He does not have any rash actually I suspect he may have some kind of epididymitis of recommended that he come in for a check before we start any treatment

## 2019-10-14 ENCOUNTER — OFFICE VISIT (OUTPATIENT)
Dept: INTERNAL MEDICINE CLINIC | Facility: CLINIC | Age: 39
End: 2019-10-14
Payer: COMMERCIAL

## 2019-10-14 VITALS
BODY MASS INDEX: 32.84 KG/M2 | OXYGEN SATURATION: 95 % | TEMPERATURE: 97 F | WEIGHT: 234.6 LBS | HEIGHT: 71 IN | RESPIRATION RATE: 16 BRPM | DIASTOLIC BLOOD PRESSURE: 88 MMHG | SYSTOLIC BLOOD PRESSURE: 122 MMHG | HEART RATE: 86 BPM

## 2019-10-14 DIAGNOSIS — N45.1 EPIDIDYMITIS, RIGHT: Primary | ICD-10-CM

## 2019-10-14 PROCEDURE — 3008F BODY MASS INDEX DOCD: CPT | Performed by: INTERNAL MEDICINE

## 2019-10-14 PROCEDURE — 99213 OFFICE O/P EST LOW 20 MIN: CPT | Performed by: INTERNAL MEDICINE

## 2019-10-14 RX ORDER — DOXYCYCLINE HYCLATE 100 MG/1
100 CAPSULE ORAL EVERY 12 HOURS SCHEDULED
Qty: 20 CAPSULE | Refills: 0 | Status: SHIPPED | OUTPATIENT
Start: 2019-10-14 | End: 2019-10-30 | Stop reason: SDUPTHER

## 2019-10-14 RX ORDER — AZITHROMYCIN 250 MG/1
TABLET, FILM COATED ORAL
Refills: 0 | COMMUNITY
Start: 2019-09-03 | End: 2020-03-12 | Stop reason: ALTCHOICE

## 2019-10-14 NOTE — PROGRESS NOTES
Assessment/Plan:    Epididymitis, right  Some mild swelling of the right testicle with tenderness over the epididymis  I suspect he has an epididymitis  Will start him on doxycycline to be taken 100 mg twice a day for 10 days  He can use a nonsteroidal anti-inflammatories such as Advil 2 tablets twice a day with food or Aleve 1 tablet twice a day with food for symptomatic relief  If his symptoms fail to resolve with this treatment recommend follow-up evaluation in the office  Diagnoses and all orders for this visit:    Epididymitis, right  -     doxycycline hyclate (VIBRAMYCIN) 100 mg capsule; Take 1 capsule (100 mg total) by mouth every 12 (twelve) hours for 10 days    Other orders  -     azithromycin (ZITHROMAX) 250 mg tablet        Subjective:      Patient ID: Amanda Smith is a 46 y o  male  This 73-year-old gentleman presents today with several days discomfort on his right scrotum and testicle area  He indicates no evidence of any history of trauma to the testicles or scrotum  There is no rash apparent on the surface of the skin of the scrotum or groin area  He denies any change in urination or ejaculation  There is no evidence of any blood in his urine or semen  He did take some Advil over the weekend which seem to decrease his symptoms  The following portions of the patient's history were reviewed and updated as appropriate:   He  has a past medical history of Onychomycosis of toenail  He   Patient Active Problem List    Diagnosis Date Noted    Epididymitis, right 10/14/2019    Otitis of right ear 09/03/2019    Chronic left-sided low back pain with left-sided sciatica 09/03/2019    Pain in both lower extremities 04/23/2019    Hyperlipidemia 04/11/2018    Libido, decreased 04/11/2018    Obesity (BMI 30-39 9) 04/11/2018    Heart burn 01/04/2017     He  has no past surgical history on file  His family history includes Hypertension in his family;  Other in his father; Pancreatic cancer in his mother  He  reports that he has never smoked  He has never used smokeless tobacco  He reports that he drinks alcohol  He reports that he does not use drugs  Current Outpatient Medications   Medication Sig Dispense Refill    azithromycin (ZITHROMAX) 250 mg tablet   0    cyanocobalamin (VITAMIN B-12) 100 mcg tablet Take by mouth daily      multivitamin (THERAGRAN) TABS Take 1 tablet by mouth daily      doxycycline hyclate (VIBRAMYCIN) 100 mg capsule Take 1 capsule (100 mg total) by mouth every 12 (twelve) hours for 10 days 20 capsule 0     No current facility-administered medications for this visit       Review of Systems   All other systems reviewed and are negative  Objective:      /88   Pulse 86   Temp (!) 97 °F (36 1 °C)   Resp 16   Ht 5' 11" (1 803 m)   Wt 106 kg (234 lb 9 6 oz)   SpO2 95%   BMI 32 72 kg/m²          Physical Exam   Constitutional: He is oriented to person, place, and time  He appears well-developed and well-nourished  HENT:   Right Ear: Hearing, tympanic membrane, external ear and ear canal normal    Left Ear: Hearing, tympanic membrane, external ear and ear canal normal    Nose: Nose normal    Mouth/Throat: Oropharynx is clear and moist and mucous membranes are normal    Eyes: Pupils are equal, round, and reactive to light  Conjunctivae are normal    Neck: No thyromegaly present  Cardiovascular: Normal rate, regular rhythm, S1 normal, S2 normal, normal heart sounds and intact distal pulses  No murmur heard  Pulmonary/Chest: Effort normal and breath sounds normal    Abdominal: Soft  Bowel sounds are normal  There is no tenderness  Hernia confirmed negative in the right inguinal area and confirmed negative in the left inguinal area  Genitourinary: Penis normal  Right testis shows swelling  Musculoskeletal: Normal range of motion  He exhibits no edema  Lymphadenopathy:     He has no cervical adenopathy   No inguinal adenopathy noted on the right or left side  Neurological: He is alert and oriented to person, place, and time  He has normal reflexes  He displays normal reflexes  Skin: Skin is warm and dry  Psychiatric: He has a normal mood and affect   His behavior is normal  Judgment and thought content normal

## 2019-10-14 NOTE — ASSESSMENT & PLAN NOTE
Some mild swelling of the right testicle with tenderness over the epididymis  I suspect he has an epididymitis  Will start him on doxycycline to be taken 100 mg twice a day for 10 days  He can use a nonsteroidal anti-inflammatories such as Advil 2 tablets twice a day with food or Aleve 1 tablet twice a day with food for symptomatic relief  If his symptoms fail to resolve with this treatment recommend follow-up evaluation in the office

## 2019-10-15 ENCOUNTER — OFFICE VISIT (OUTPATIENT)
Dept: PODIATRY | Facility: CLINIC | Age: 39
End: 2019-10-15

## 2019-10-15 VITALS
BODY MASS INDEX: 32.9 KG/M2 | DIASTOLIC BLOOD PRESSURE: 74 MMHG | WEIGHT: 235 LBS | SYSTOLIC BLOOD PRESSURE: 121 MMHG | HEIGHT: 71 IN

## 2019-10-15 DIAGNOSIS — L60.3 NAIL DYSTROPHY: Primary | ICD-10-CM

## 2019-10-15 PROCEDURE — NCFTCARE PR NON-COVERED FOOT CARE: Performed by: PODIATRIST

## 2019-10-15 NOTE — PROGRESS NOTES
Patient presents for palliative nail care  Pedal pulses are within normal limits  Treatment consisted of nail trimming

## 2019-10-30 ENCOUNTER — TELEPHONE (OUTPATIENT)
Dept: INTERNAL MEDICINE CLINIC | Facility: CLINIC | Age: 39
End: 2019-10-30

## 2019-10-30 DIAGNOSIS — N45.1 EPIDIDYMITIS, RIGHT: ICD-10-CM

## 2019-10-30 RX ORDER — DOXYCYCLINE HYCLATE 100 MG/1
100 CAPSULE ORAL EVERY 12 HOURS SCHEDULED
Qty: 20 CAPSULE | Refills: 0 | Status: SHIPPED | OUTPATIENT
Start: 2019-10-30 | End: 2019-11-09

## 2019-10-30 NOTE — PROGRESS NOTES
Patient called after 10 days course of his doxycycline indicates that his discomfort is improved but he still has some discomfort  Will extend his course of antibiotic for an additional 10 days of doxycycline 100 mg twice a day  I asked him to return for follow-up evaluation if his read symptoms are not completely resolved at the end of this additional 10 day course of antibiotic

## 2019-10-30 NOTE — TELEPHONE ENCOUNTER
Call was returned to the patient we discussed his symptoms will renew his antibiotic for an additional 10 days treatment

## 2019-10-30 NOTE — TELEPHONE ENCOUNTER
Pt says that he is still experiencing irritation of the jock area since seeing Karime Dear on 10/14/19  Pt would appreciate a call back today for medical advice at phone number [phone]  Thank you!

## 2019-11-06 ENCOUNTER — TELEPHONE (OUTPATIENT)
Dept: INTERNAL MEDICINE CLINIC | Facility: CLINIC | Age: 39
End: 2019-11-06

## 2019-11-06 NOTE — TELEPHONE ENCOUNTER
Please call the patient back if he is still having symptoms I would like to see him in the office for follow-up evaluation thank you

## 2019-11-06 NOTE — TELEPHONE ENCOUNTER
Patient called stating that the antibiotic he is on is still not working  He is still having symptoms and he states that now it is making him experience episodes of dizziness  He can be reached at [phone]

## 2019-11-12 ENCOUNTER — OFFICE VISIT (OUTPATIENT)
Dept: INTERNAL MEDICINE CLINIC | Facility: CLINIC | Age: 39
End: 2019-11-12
Payer: COMMERCIAL

## 2019-11-12 VITALS
TEMPERATURE: 97.9 F | HEART RATE: 85 BPM | HEIGHT: 71 IN | SYSTOLIC BLOOD PRESSURE: 126 MMHG | RESPIRATION RATE: 14 BRPM | DIASTOLIC BLOOD PRESSURE: 80 MMHG | WEIGHT: 231.6 LBS | BODY MASS INDEX: 32.42 KG/M2 | OXYGEN SATURATION: 96 %

## 2019-11-12 DIAGNOSIS — Z13.1 SCREENING FOR DIABETES MELLITUS: ICD-10-CM

## 2019-11-12 DIAGNOSIS — Z13.0 SCREENING FOR DEFICIENCY ANEMIA: ICD-10-CM

## 2019-11-12 DIAGNOSIS — Z13.29 SCREENING FOR HYPOTHYROIDISM: ICD-10-CM

## 2019-11-12 DIAGNOSIS — N45.1 EPIDIDYMITIS, RIGHT: ICD-10-CM

## 2019-11-12 DIAGNOSIS — E78.5 HYPERLIPIDEMIA, UNSPECIFIED HYPERLIPIDEMIA TYPE: Primary | ICD-10-CM

## 2019-11-12 DIAGNOSIS — R53.83 OTHER FATIGUE: ICD-10-CM

## 2019-11-12 DIAGNOSIS — H69.81 DYSFUNCTION OF RIGHT EUSTACHIAN TUBE: ICD-10-CM

## 2019-11-12 PROBLEM — R12 HEART BURN: Status: RESOLVED | Noted: 2017-01-04 | Resolved: 2019-11-12

## 2019-11-12 PROBLEM — H66.91 OTITIS OF RIGHT EAR: Status: RESOLVED | Noted: 2019-09-03 | Resolved: 2019-11-12

## 2019-11-12 PROBLEM — G89.29 CHRONIC LEFT-SIDED LOW BACK PAIN WITH LEFT-SIDED SCIATICA: Status: RESOLVED | Noted: 2019-09-03 | Resolved: 2019-11-12

## 2019-11-12 PROBLEM — H69.91 DYSFUNCTION OF RIGHT EUSTACHIAN TUBE: Status: ACTIVE | Noted: 2019-11-12

## 2019-11-12 PROBLEM — M54.42 CHRONIC LEFT-SIDED LOW BACK PAIN WITH LEFT-SIDED SCIATICA: Status: RESOLVED | Noted: 2019-09-03 | Resolved: 2019-11-12

## 2019-11-12 PROBLEM — R68.82 LIBIDO, DECREASED: Status: RESOLVED | Noted: 2018-04-11 | Resolved: 2019-11-12

## 2019-11-12 PROCEDURE — 1036F TOBACCO NON-USER: CPT | Performed by: INTERNAL MEDICINE

## 2019-11-12 PROCEDURE — 99214 OFFICE O/P EST MOD 30 MIN: CPT | Performed by: INTERNAL MEDICINE

## 2019-11-12 RX ORDER — FLUTICASONE PROPIONATE 50 MCG
1 SPRAY, SUSPENSION (ML) NASAL DAILY
Qty: 16 G | Refills: 1 | Status: SHIPPED | OUTPATIENT
Start: 2019-11-12 | End: 2019-11-14 | Stop reason: CLARIF

## 2019-11-12 NOTE — ASSESSMENT & PLAN NOTE
Patient presents today concerned about symptoms of fatigue  His physical examination does not show any evidence of active infection at this time  It is unclear as to whether not there may be a metabolic issue and I have ordered blood work including a CBC thyroid performance evaluation as well as screening for diabetic condition kidney and liver disease  I think most likely he has been working hard with increased levels of stress and this is basically warning down  I will review the results of his studies once a her completed

## 2019-11-12 NOTE — ASSESSMENT & PLAN NOTE
The patient's previous it epididymitis symptoms have resolved and he no longer has any discomfort he has completed his antibiotic therapy  No further evaluation or treatment is necessary at this time

## 2019-11-12 NOTE — ASSESSMENT & PLAN NOTE
The patient describes a feeling of occasional lightheadedness with movement  I believe he has any congestion in his eustation tube on the right side based upon today's examination  There is a slight bulge on the left side as well  I have recommended that we start him on a antihistamine therapy of Claritin 1 tablet 10 mg daily and also fluticasone nasal spray 1 puff in each nostril twice a day  Recommend continuing this therapy for period of at least 2 weeks  Follow-up if symptoms fail to resolve

## 2019-11-12 NOTE — PROGRESS NOTES
Assessment/Plan:    Epididymitis, right  The patient's previous it epididymitis symptoms have resolved and he no longer has any discomfort he has completed his antibiotic therapy  No further evaluation or treatment is necessary at this time  Other fatigue  Patient presents today concerned about symptoms of fatigue  His physical examination does not show any evidence of active infection at this time  It is unclear as to whether not there may be a metabolic issue and I have ordered blood work including a CBC thyroid performance evaluation as well as screening for diabetic condition kidney and liver disease  I think most likely he has been working hard with increased levels of stress and this is basically warning down  I will review the results of his studies once a her completed  Dysfunction of right eustachian tube  The patient describes a feeling of occasional lightheadedness with movement  I believe he has any congestion in his eustation tube on the right side based upon today's examination  There is a slight bulge on the left side as well  I have recommended that we start him on a antihistamine therapy of Claritin 1 tablet 10 mg daily and also fluticasone nasal spray 1 puff in each nostril twice a day  Recommend continuing this therapy for period of at least 2 weeks  Follow-up if symptoms fail to resolve  Diagnoses and all orders for this visit:    Hyperlipidemia, unspecified hyperlipidemia type  -     Lipid panel; Future    Screening for hypothyroidism  -     TSH, 3rd generation; Future    Screening for deficiency anemia  -     CBC and differential; Future    Screening for diabetes mellitus  -     Comprehensive metabolic panel; Future    Dysfunction of right eustachian tube  -     fluticasone (FLONASE) 50 mcg/act nasal spray; 1 spray into each nostril daily        Subjective:      Patient ID: Andre Harmon is a 46 y o  male      This 52-year-old gentleman presents today for evaluation of symptoms of fatigue as well as a lack of energy and at times of feeling of lightheadedness and instability  He indicates that the testicular discomfort that he presented with on his last visit has now resolved  He has completed his antibiotic therapy but believes that some of his symptoms may be due to the antibiotic side effects  The patient has been particularly busy over the past several weeks with his job and business  He also has been traveling on weekends  He has a fairly high stress level as 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  Approximately 25 minutes spent with the patient today will review of symptoms as well as examination and discussion and answering of questions  The following portions of the patient's history were reviewed and updated as appropriate:   He  has a past medical history of Onychomycosis of toenail  He   Patient Active Problem List    Diagnosis Date Noted    Other fatigue 11/12/2019    Dysfunction of right eustachian tube 11/12/2019    Epididymitis, right 10/14/2019    Pain in both lower extremities 04/23/2019    Hyperlipidemia 04/11/2018    Obesity (BMI 30-39 9) 04/11/2018     He  has no past surgical history on file  His family history includes Hypertension in his family; Other in his father; Pancreatic cancer in his mother  He  reports that he has never smoked  He has never used smokeless tobacco  He reports that he drinks alcohol  He reports that he does not use drugs  Current Outpatient Medications   Medication Sig Dispense Refill    azithromycin (ZITHROMAX) 250 mg tablet   0    cyanocobalamin (VITAMIN B-12) 100 mcg tablet Take by mouth daily      fluticasone (FLONASE) 50 mcg/act nasal spray 1 spray into each nostril daily 16 g 1    multivitamin (THERAGRAN) TABS Take 1 tablet by mouth daily       No current facility-administered medications for this visit       Review of Systems   Constitutional: Positive for fatigue     Neurological: Positive for weakness and light-headedness  All other systems reviewed and are negative  Objective:      /80   Pulse 85   Temp 97 9 °F (36 6 °C)   Resp 14   Ht 5' 11" (1 803 m)   Wt 105 kg (231 lb 9 6 oz)   SpO2 96%   BMI 32 30 kg/m²          Physical Exam   Constitutional: He is oriented to person, place, and time  He appears well-developed and well-nourished  HENT:   Head: Normocephalic and atraumatic  Right Ear: Hearing, external ear and ear canal normal  Tympanic membrane is bulging  Left Ear: Hearing, external ear and ear canal normal  Tympanic membrane is bulging  Nose: Mucosal edema and rhinorrhea present  Mouth/Throat: Oropharynx is clear and moist and mucous membranes are normal    Eyes: Pupils are equal, round, and reactive to light  Conjunctivae are normal    Neck: No JVD present  No tracheal deviation present  No thyromegaly present  No lymphadenopathy noted in the neck or submandibular region or supraclavicular region   Cardiovascular: Normal rate, regular rhythm, S1 normal, S2 normal, normal heart sounds and intact distal pulses  No murmur heard  Pulmonary/Chest: Effort normal and breath sounds normal  No stridor  No respiratory distress  Musculoskeletal: Normal range of motion  He exhibits no edema  Lymphadenopathy:     He has no cervical adenopathy  Neurological: He is alert and oriented to person, place, and time  He has normal reflexes  He displays normal reflexes  Skin: Skin is warm and dry  Psychiatric: He has a normal mood and affect   His behavior is normal  Judgment and thought content normal

## 2019-11-14 DIAGNOSIS — H69.81 DYSFUNCTION OF RIGHT EUSTACHIAN TUBE: Primary | ICD-10-CM

## 2019-11-14 RX ORDER — MOMETASONE FUROATE 50 UG/1
1 SPRAY, METERED NASAL 2 TIMES DAILY
Qty: 17 G | Refills: 2 | Status: SHIPPED | OUTPATIENT
Start: 2019-11-14

## 2019-11-20 ENCOUNTER — APPOINTMENT (OUTPATIENT)
Dept: LAB | Facility: CLINIC | Age: 39
End: 2019-11-20
Payer: COMMERCIAL

## 2019-11-20 DIAGNOSIS — E78.5 HYPERLIPIDEMIA, UNSPECIFIED HYPERLIPIDEMIA TYPE: ICD-10-CM

## 2019-11-20 DIAGNOSIS — Z13.29 SCREENING FOR HYPOTHYROIDISM: ICD-10-CM

## 2019-11-20 DIAGNOSIS — Z13.1 SCREENING FOR DIABETES MELLITUS: ICD-10-CM

## 2019-11-20 DIAGNOSIS — Z13.0 SCREENING FOR DEFICIENCY ANEMIA: ICD-10-CM

## 2019-11-20 LAB
ALBUMIN SERPL BCP-MCNC: 4.1 G/DL (ref 3.5–5)
ALP SERPL-CCNC: 72 U/L (ref 46–116)
ALT SERPL W P-5'-P-CCNC: 51 U/L (ref 12–78)
ANION GAP SERPL CALCULATED.3IONS-SCNC: 7 MMOL/L (ref 4–13)
AST SERPL W P-5'-P-CCNC: 30 U/L (ref 5–45)
BASOPHILS # BLD AUTO: 0.05 THOUSANDS/ΜL (ref 0–0.1)
BASOPHILS NFR BLD AUTO: 1 % (ref 0–1)
BILIRUB SERPL-MCNC: 0.8 MG/DL (ref 0.2–1)
BUN SERPL-MCNC: 17 MG/DL (ref 5–25)
CALCIUM SERPL-MCNC: 9.1 MG/DL (ref 8.3–10.1)
CHLORIDE SERPL-SCNC: 102 MMOL/L (ref 100–108)
CHOLEST SERPL-MCNC: 197 MG/DL (ref 50–200)
CO2 SERPL-SCNC: 30 MMOL/L (ref 21–32)
CREAT SERPL-MCNC: 1.18 MG/DL (ref 0.6–1.3)
EOSINOPHIL # BLD AUTO: 0.14 THOUSAND/ΜL (ref 0–0.61)
EOSINOPHIL NFR BLD AUTO: 2 % (ref 0–6)
ERYTHROCYTE [DISTWIDTH] IN BLOOD BY AUTOMATED COUNT: 12.4 % (ref 11.6–15.1)
GFR SERPL CREATININE-BSD FRML MDRD: 78 ML/MIN/1.73SQ M
GLUCOSE P FAST SERPL-MCNC: 88 MG/DL (ref 65–99)
HCT VFR BLD AUTO: 47.6 % (ref 36.5–49.3)
HDLC SERPL-MCNC: 42 MG/DL
HGB BLD-MCNC: 15.5 G/DL (ref 12–17)
IMM GRANULOCYTES # BLD AUTO: 0.06 THOUSAND/UL (ref 0–0.2)
IMM GRANULOCYTES NFR BLD AUTO: 1 % (ref 0–2)
LDLC SERPL CALC-MCNC: 134 MG/DL (ref 0–100)
LYMPHOCYTES # BLD AUTO: 1.99 THOUSANDS/ΜL (ref 0.6–4.47)
LYMPHOCYTES NFR BLD AUTO: 27 % (ref 14–44)
MCH RBC QN AUTO: 28.6 PG (ref 26.8–34.3)
MCHC RBC AUTO-ENTMCNC: 32.6 G/DL (ref 31.4–37.4)
MCV RBC AUTO: 88 FL (ref 82–98)
MONOCYTES # BLD AUTO: 0.71 THOUSAND/ΜL (ref 0.17–1.22)
MONOCYTES NFR BLD AUTO: 10 % (ref 4–12)
NEUTROPHILS # BLD AUTO: 4.32 THOUSANDS/ΜL (ref 1.85–7.62)
NEUTS SEG NFR BLD AUTO: 59 % (ref 43–75)
NONHDLC SERPL-MCNC: 155 MG/DL
NRBC BLD AUTO-RTO: 0 /100 WBCS
PLATELET # BLD AUTO: 244 THOUSANDS/UL (ref 149–390)
PMV BLD AUTO: 11.3 FL (ref 8.9–12.7)
POTASSIUM SERPL-SCNC: 3.9 MMOL/L (ref 3.5–5.3)
PROT SERPL-MCNC: 7.8 G/DL (ref 6.4–8.2)
RBC # BLD AUTO: 5.42 MILLION/UL (ref 3.88–5.62)
SODIUM SERPL-SCNC: 139 MMOL/L (ref 136–145)
TRIGL SERPL-MCNC: 106 MG/DL
TSH SERPL DL<=0.05 MIU/L-ACNC: 1.08 UIU/ML (ref 0.36–3.74)
WBC # BLD AUTO: 7.27 THOUSAND/UL (ref 4.31–10.16)

## 2019-11-20 PROCEDURE — 80053 COMPREHEN METABOLIC PANEL: CPT

## 2019-11-20 PROCEDURE — 36415 COLL VENOUS BLD VENIPUNCTURE: CPT

## 2019-11-20 PROCEDURE — 80061 LIPID PANEL: CPT

## 2019-11-20 PROCEDURE — 85025 COMPLETE CBC W/AUTO DIFF WBC: CPT

## 2019-11-20 PROCEDURE — 84443 ASSAY THYROID STIM HORMONE: CPT

## 2019-12-17 ENCOUNTER — OFFICE VISIT (OUTPATIENT)
Dept: PODIATRY | Facility: CLINIC | Age: 39
End: 2019-12-17

## 2019-12-17 VITALS
HEART RATE: 81 BPM | BODY MASS INDEX: 32.42 KG/M2 | DIASTOLIC BLOOD PRESSURE: 69 MMHG | HEIGHT: 71 IN | SYSTOLIC BLOOD PRESSURE: 112 MMHG | WEIGHT: 231.6 LBS

## 2019-12-17 DIAGNOSIS — L60.3 NAIL DYSTROPHY: Primary | ICD-10-CM

## 2019-12-17 PROCEDURE — NCFTCARE PR NON-COVERED FOOT CARE: Performed by: PODIATRIST

## 2019-12-17 NOTE — PROGRESS NOTES
Patient presents for palliative foot care  Treatment consisted of nail and lesion trimming  Pedal pulses are within normal limits

## 2020-02-06 ENCOUNTER — OFFICE VISIT (OUTPATIENT)
Dept: PODIATRY | Facility: CLINIC | Age: 40
End: 2020-02-06

## 2020-02-06 VITALS
HEART RATE: 73 BPM | BODY MASS INDEX: 32.48 KG/M2 | WEIGHT: 232 LBS | HEIGHT: 71 IN | SYSTOLIC BLOOD PRESSURE: 135 MMHG | DIASTOLIC BLOOD PRESSURE: 90 MMHG

## 2020-02-06 DIAGNOSIS — L60.3 NAIL DYSTROPHY: Primary | ICD-10-CM

## 2020-02-06 PROCEDURE — NCFTCARE PR NON-COVERED FOOT CARE: Performed by: PODIATRIST

## 2020-03-12 ENCOUNTER — OFFICE VISIT (OUTPATIENT)
Dept: INTERNAL MEDICINE CLINIC | Facility: CLINIC | Age: 40
End: 2020-03-12
Payer: COMMERCIAL

## 2020-03-12 VITALS — TEMPERATURE: 98.6 F | OXYGEN SATURATION: 98 % | RESPIRATION RATE: 18 BRPM | HEART RATE: 88 BPM

## 2020-03-12 DIAGNOSIS — J01.00 ACUTE NON-RECURRENT MAXILLARY SINUSITIS: Primary | ICD-10-CM

## 2020-03-12 PROBLEM — H69.81 DYSFUNCTION OF RIGHT EUSTACHIAN TUBE: Status: RESOLVED | Noted: 2019-11-12 | Resolved: 2020-03-12

## 2020-03-12 PROBLEM — N45.1 EPIDIDYMITIS, RIGHT: Status: RESOLVED | Noted: 2019-10-14 | Resolved: 2020-03-12

## 2020-03-12 PROBLEM — H69.91 DYSFUNCTION OF RIGHT EUSTACHIAN TUBE: Status: RESOLVED | Noted: 2019-11-12 | Resolved: 2020-03-12

## 2020-03-12 PROCEDURE — 99213 OFFICE O/P EST LOW 20 MIN: CPT | Performed by: INTERNAL MEDICINE

## 2020-03-12 PROCEDURE — 1036F TOBACCO NON-USER: CPT | Performed by: INTERNAL MEDICINE

## 2020-03-12 RX ORDER — AZITHROMYCIN 250 MG/1
TABLET, FILM COATED ORAL
Qty: 6 TABLET | Refills: 0 | Status: SHIPPED | OUTPATIENT
Start: 2020-03-12 | End: 2020-03-17

## 2020-03-12 NOTE — ASSESSMENT & PLAN NOTE
This 44-year-old gentleman presents today with symptoms suggestive of upper respiratory infection with sinus involvement as well as pharyngitis  Evaluation on physical examination indicates of thick light green mucus in the nasal passages along with posterior pharyngeal irritation  He does not seem to have any significant lymphadenopathy and his lungs are clear  I suspect he has a bacterial sinus and throat infection will start him on Zithromax 5 day pack  I have encouraged him to get extra rest maintain good hydration as well as good nutrition  Follow-up is requested if his symptoms fail to resolve

## 2020-03-12 NOTE — PROGRESS NOTES
Assessment/Plan:    Acute non-recurrent maxillary sinusitis    This 27-year-old gentleman presents today with symptoms suggestive of upper respiratory infection with sinus involvement as well as pharyngitis  Evaluation on physical examination indicates of thick light green mucus in the nasal passages along with posterior pharyngeal irritation  He does not seem to have any significant lymphadenopathy and his lungs are clear  I suspect he has a bacterial sinus and throat infection will start him on Zithromax 5 day pack  I have encouraged him to get extra rest maintain good hydration as well as good nutrition  Follow-up is requested if his symptoms fail to resolve  Diagnoses and all orders for this visit:    Acute non-recurrent maxillary sinusitis  -     azithromycin (Zithromax) 250 mg tablet; Take 2 tablets (500 mg total) by mouth daily for 1 day, THEN 1 tablet (250 mg total) daily for 4 days  Subjective:      Patient ID: Ricky Lewis is a 46 y o  male  This is an acute visit for this 27-year-old gentleman  He presents today with symptoms of sore throat and sinus congestion for several days  He has had an occasional nonproductive cough  He does not feel that he has had any fever or chills over the past several days  He denies any nausea vomiting or diarrhea symptoms  The following portions of the patient's history were reviewed and updated as appropriate:   He  has a past medical history of Onychomycosis of toenail  He   Patient Active Problem List    Diagnosis Date Noted    Acute non-recurrent maxillary sinusitis 03/12/2020    Other fatigue 11/12/2019    Pain in both lower extremities 04/23/2019    Hyperlipidemia 04/11/2018    Obesity (BMI 30-39 9) 04/11/2018     He  has no past surgical history on file  His family history includes Hypertension in his family; Other in his father; Pancreatic cancer in his mother  He  reports that he has never smoked   He has never used smokeless tobacco  He reports that he drinks alcohol  He reports that he does not use drugs  Current Outpatient Medications   Medication Sig Dispense Refill    azithromycin (Zithromax) 250 mg tablet Take 2 tablets (500 mg total) by mouth daily for 1 day, THEN 1 tablet (250 mg total) daily for 4 days  6 tablet 0    cyanocobalamin (VITAMIN B-12) 100 mcg tablet Take by mouth daily      mometasone (NASONEX) 50 mcg/act nasal spray 1 spray into each nostril 2 (two) times a day 17 g 2    multivitamin (THERAGRAN) TABS Take 1 tablet by mouth daily       No current facility-administered medications for this visit       Review of Systems   HENT: Positive for congestion, sinus pressure and sore throat  Respiratory: Positive for cough  All other systems reviewed and are negative  Objective:      Pulse 88   Temp 98 6 °F (37 °C)   Resp 18   SpO2 98%          Physical Exam   Constitutional: He is oriented to person, place, and time  He appears well-developed and well-nourished  No distress  HENT:   Right Ear: Hearing, tympanic membrane, external ear and ear canal normal    Left Ear: Hearing, tympanic membrane, external ear and ear canal normal    Nose: Mucosal edema and rhinorrhea present  Mouth/Throat: Mucous membranes are normal  Posterior oropharyngeal erythema present  Eyes: Pupils are equal, round, and reactive to light  Conjunctivae are normal  Right eye exhibits no discharge  Left eye exhibits no discharge  No scleral icterus  Neck: No thyromegaly present  Cardiovascular: Normal rate, regular rhythm, S1 normal, S2 normal, normal heart sounds and intact distal pulses  No murmur heard  Pulmonary/Chest: Effort normal and breath sounds normal  No stridor  No respiratory distress  He has no wheezes  He has no rales  Abdominal: Soft  Bowel sounds are normal    Musculoskeletal: Normal range of motion  He exhibits no edema  Lymphadenopathy:     He has no cervical adenopathy     Neurological: He is alert and oriented to person, place, and time  He has normal reflexes  Skin: Skin is warm and dry  He is not diaphoretic  Psychiatric: He has a normal mood and affect   His behavior is normal  Judgment and thought content normal

## 2020-04-23 ENCOUNTER — OFFICE VISIT (OUTPATIENT)
Dept: PODIATRY | Facility: CLINIC | Age: 40
End: 2020-04-23

## 2020-04-23 VITALS — HEIGHT: 71 IN | WEIGHT: 221.2 LBS | BODY MASS INDEX: 30.97 KG/M2

## 2020-04-23 DIAGNOSIS — L60.3 NAIL DYSTROPHY: Primary | ICD-10-CM

## 2020-04-23 PROCEDURE — NCFTCARE PR NON-COVERED FOOT CARE: Performed by: PODIATRIST

## 2020-06-04 ENCOUNTER — OFFICE VISIT (OUTPATIENT)
Dept: PODIATRY | Facility: CLINIC | Age: 40
End: 2020-06-04

## 2020-06-04 VITALS — WEIGHT: 216 LBS | HEIGHT: 71 IN | BODY MASS INDEX: 30.24 KG/M2

## 2020-06-04 DIAGNOSIS — L60.3 NAIL DYSTROPHY: Primary | ICD-10-CM

## 2020-06-04 PROCEDURE — NCFTCARE PR NON-COVERED FOOT CARE: Performed by: PODIATRIST

## 2020-08-17 ENCOUNTER — OFFICE VISIT (OUTPATIENT)
Dept: PODIATRY | Facility: CLINIC | Age: 40
End: 2020-08-17

## 2020-08-17 VITALS
HEART RATE: 84 BPM | WEIGHT: 215 LBS | SYSTOLIC BLOOD PRESSURE: 138 MMHG | DIASTOLIC BLOOD PRESSURE: 92 MMHG | BODY MASS INDEX: 30.1 KG/M2 | HEIGHT: 71 IN

## 2020-08-17 DIAGNOSIS — L60.3 NAIL DYSTROPHY: Primary | ICD-10-CM

## 2020-08-17 PROCEDURE — NCFTCARE PR NON-COVERED FOOT CARE: Performed by: PODIATRIST

## 2020-08-17 PROCEDURE — 3008F BODY MASS INDEX DOCD: CPT | Performed by: INTERNAL MEDICINE

## 2020-08-17 NOTE — PROGRESS NOTES
Patient presents for palliative foot care  No acute disorder noted  Pedal pulses are within normal limits  Treatment consisted of nail trimming

## 2020-09-25 ENCOUNTER — OFFICE VISIT (OUTPATIENT)
Dept: INTERNAL MEDICINE CLINIC | Facility: CLINIC | Age: 40
End: 2020-09-25
Payer: COMMERCIAL

## 2020-09-25 VITALS
TEMPERATURE: 97.5 F | WEIGHT: 225.4 LBS | SYSTOLIC BLOOD PRESSURE: 122 MMHG | HEIGHT: 71 IN | HEART RATE: 98 BPM | OXYGEN SATURATION: 98 % | BODY MASS INDEX: 31.56 KG/M2 | DIASTOLIC BLOOD PRESSURE: 76 MMHG

## 2020-09-25 DIAGNOSIS — H69.93 EUSTACHIAN TUBE DISORDER, BILATERAL: ICD-10-CM

## 2020-09-25 DIAGNOSIS — Z13.1 SCREENING FOR DIABETES MELLITUS: ICD-10-CM

## 2020-09-25 DIAGNOSIS — E78.5 HYPERLIPIDEMIA, UNSPECIFIED HYPERLIPIDEMIA TYPE: ICD-10-CM

## 2020-09-25 DIAGNOSIS — Z00.00 HEALTH CARE MAINTENANCE: Primary | ICD-10-CM

## 2020-09-25 DIAGNOSIS — E66.9 OBESITY (BMI 30-39.9): ICD-10-CM

## 2020-09-25 DIAGNOSIS — Z13.0 SCREENING FOR DEFICIENCY ANEMIA: ICD-10-CM

## 2020-09-25 DIAGNOSIS — Z23 NEED FOR VACCINATION: ICD-10-CM

## 2020-09-25 DIAGNOSIS — B36.9 FUNGAL SKIN INFECTION: ICD-10-CM

## 2020-09-25 DIAGNOSIS — T78.40XA ALLERGIC STATE, INITIAL ENCOUNTER: ICD-10-CM

## 2020-09-25 PROBLEM — M79.604 PAIN IN BOTH LOWER EXTREMITIES: Status: RESOLVED | Noted: 2019-04-23 | Resolved: 2020-09-25

## 2020-09-25 PROBLEM — R53.83 OTHER FATIGUE: Status: RESOLVED | Noted: 2019-11-12 | Resolved: 2020-09-25

## 2020-09-25 PROBLEM — M79.605 PAIN IN BOTH LOWER EXTREMITIES: Status: RESOLVED | Noted: 2019-04-23 | Resolved: 2020-09-25

## 2020-09-25 PROBLEM — J01.00 ACUTE NON-RECURRENT MAXILLARY SINUSITIS: Status: RESOLVED | Noted: 2020-03-12 | Resolved: 2020-09-25

## 2020-09-25 PROCEDURE — 1036F TOBACCO NON-USER: CPT | Performed by: INTERNAL MEDICINE

## 2020-09-25 PROCEDURE — 3725F SCREEN DEPRESSION PERFORMED: CPT | Performed by: INTERNAL MEDICINE

## 2020-09-25 PROCEDURE — 99395 PREV VISIT EST AGE 18-39: CPT | Performed by: INTERNAL MEDICINE

## 2020-09-25 PROCEDURE — 90686 IIV4 VACC NO PRSV 0.5 ML IM: CPT

## 2020-09-25 PROCEDURE — 90471 IMMUNIZATION ADMIN: CPT

## 2020-09-25 RX ORDER — NYSTATIN 100000 [USP'U]/G
POWDER TOPICAL 2 TIMES DAILY
Qty: 30 G | Refills: 0 | Status: SHIPPED | OUTPATIENT
Start: 2020-09-25

## 2020-09-25 RX ORDER — MAGNESIUM OXIDE/MAG AA CHELATE 133 MG
1 TABLET ORAL 2 TIMES DAILY
COMMUNITY

## 2020-09-25 NOTE — ASSESSMENT & PLAN NOTE
Previous mild elevation in LDL is noted  I have provided the patient with patient education material today to adjust his diet to reduce saturated fats a follow-up lipid profile has also been requested for the patient to have done in the near future  Will review the results with him when they are completed

## 2020-09-25 NOTE — ASSESSMENT & PLAN NOTE
Probable skin infection of the groin area the patient notes occasional foul odor  We have started him on nystatin powder twice a day for 2 weeks

## 2020-09-25 NOTE — ASSESSMENT & PLAN NOTE
Patient appears to have bilateral tympanic membrane bulges which are likely due to eustachian tube congestion secondary to allergies  Recommend the initiation of Claritin 10 mg daily with the addition of fluticasone 1 puff each nostril twice a day for relief of symptoms if symptoms persist follow-up is recommended  No indication of active infection based upon today's examination

## 2020-09-25 NOTE — ASSESSMENT & PLAN NOTE
The patient today underwent a annual physical examination  His general health appears to be very good I have recommended that he work on reducing his weight and maintain a active physical exercise schedule period follow-up examination is requested in 1 year    The patient was provided a request for lipid profile CBC and comprehensive metabolic profile as routine screening studies

## 2020-09-25 NOTE — ASSESSMENT & PLAN NOTE
Reviewed the patient's 10 lb weight gain over the past year his body mass index is now 31 44 significantly above recommended value  Recommend that he work on reducing calorie consumption and continue regular exercise in an effort to reduce his body mass

## 2020-09-25 NOTE — PROGRESS NOTES
Assessment/Plan:    Eustachian tube disorder, bilateral  Patient appears to have bilateral tympanic membrane bulges which are likely due to eustachian tube congestion secondary to allergies  Recommend the initiation of Claritin 10 mg daily with the addition of fluticasone 1 puff each nostril twice a day for relief of symptoms if symptoms persist follow-up is recommended  No indication of active infection based upon today's examination  Fungal skin infection  Probable skin infection of the groin area the patient notes occasional foul odor  We have started him on nystatin powder twice a day for 2 weeks  Obesity (BMI 30-39  9)  Reviewed the patient's 10 lb weight gain over the past year his body mass index is now 31 44 significantly above recommended value  Recommend that he work on reducing calorie consumption and continue regular exercise in an effort to reduce his body mass  Hyperlipidemia  Previous mild elevation in LDL is noted  I have provided the patient with patient education material today to adjust his diet to reduce saturated fats a follow-up lipid profile has also been requested for the patient to have done in the near future  Will review the results with him when they are completed  Health care maintenance  The patient today underwent a annual physical examination  His general health appears to be very good I have recommended that he work on reducing his weight and maintain a active physical exercise schedule period follow-up examination is requested in 1 year  The patient was provided a request for lipid profile CBC and comprehensive metabolic profile as routine screening studies    Allergy  Allergy symptoms are noted with congestion of the sinus membranes as well as bilateral tympanic membrane bulge    He has been instructed to start Claritin 10 mg daily and to add fluticasone nasal spray if the Claritin alone does not relieve his symptoms       Diagnoses and all orders for this visit:    Hyperlipidemia, unspecified hyperlipidemia type  -     Lipid panel; Future    Fungal skin infection  -     nystatin (MYCOSTATIN) powder; Apply topically 2 (two) times a day    Need for vaccination  -     influenza vaccine, quadrivalent, 0 5 mL, preservative-free, for adult and pediatric patients 6 mos+ (AFLURIA, FLUARIX, FLULAVAL, FLUZONE)    Screening for deficiency anemia  -     CBC and differential; Future    Screening for diabetes mellitus  -     Comprehensive metabolic panel; Future    Obesity (BMI 30-39  9)    Health care maintenance    Other orders  -     Specialty Vitamins Products (magnesium, amino acid chelate,) 133 MG tablet; Take 1 tablet by mouth 2 (two) times a day        Subjective:      Patient ID: Guanako Wong is a 46 y o  male  This 20-year-old gentleman presents today for his annual complete physical examination  He has concerns about 2 issues  The 1st being occasional tinnitus in his ears bilaterally  Of this is only parent when the surrounding sound level is very low such as at nighttime  He has had no decrease in hearing acuity  His 2nd concern is regarding burning sensation in skin irritation in the scrotum and groin area  The patient has embarked on a regular exercise program of walking and weight lifting  He tries to maintain a healthy diet but has gained approximately 10 lb over the past year  He understands the importance of healthy diet and regular exercise  We did discuss the benefits of weight reduction  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  He has had no signs or symptoms of COVID-19 infection  The following portions of the patient's history were reviewed and updated as appropriate:    He  has a past medical history of Onychomycosis of toenail    He   Patient Active Problem List    Diagnosis Date Noted    Fungal skin infection 09/25/2020    Eustachian tube disorder, bilateral 09/25/2020 [facility] maintenance 09/25/2020    Allergy 09/25/2020    Hyperlipidemia 04/11/2018    Obesity (BMI 30-39 9) 04/11/2018     He  has no past surgical history on file  His family history includes Hypertension in his family; Other in his father; Pancreatic cancer in his mother  He  reports that he has never smoked  He has never used smokeless tobacco  He reports current alcohol use  He reports that he does not use drugs  Current Outpatient Medications   Medication Sig Dispense Refill    cyanocobalamin (VITAMIN B-12) 100 mcg tablet Take by mouth daily      mometasone (NASONEX) 50 mcg/act nasal spray 1 spray into each nostril 2 (two) times a day 17 g 2    multivitamin (THERAGRAN) TABS Take 1 tablet by mouth daily      Specialty Vitamins Products (magnesium, amino acid chelate,) 133 MG tablet Take 1 tablet by mouth 2 (two) times a day      nystatin (MYCOSTATIN) powder Apply topically 2 (two) times a day 30 g 0     No current facility-administered medications for this visit       Review of Systems   HENT: Positive for tinnitus  Musculoskeletal: Positive for back pain  All other systems reviewed and are negative  Objective:      /76   Pulse 98   Temp 97 5 °F (36 4 °C)   Ht 5' 11" (1 803 m)   Wt 102 kg (225 lb 6 4 oz)   SpO2 98%   BMI 31 44 kg/m²          Physical Exam  Constitutional:       General: He is not in acute distress  Appearance: He is well-developed  He is not ill-appearing  HENT:      Head: Normocephalic  Right Ear: Hearing, ear canal and external ear normal       Left Ear: Hearing, ear canal and external ear normal       Ears:      Comments: Bilateral tympanic membrane bulge with no erythema or fluid visible behind the eardrums  Nose: Congestion present  Comments: Mild bilateral nasal congestion consistent with allergies    No evidence of color or thickness to the mucus     Mouth/Throat:      Mouth: Mucous membranes are moist       Pharynx: Oropharynx is clear  No oropharyngeal exudate or posterior oropharyngeal erythema  Eyes:      General:         Right eye: No discharge  Left eye: No discharge  Conjunctiva/sclera: Conjunctivae normal       Pupils: Pupils are equal, round, and reactive to light  Neck:      Musculoskeletal: Normal range of motion and neck supple  No neck rigidity or muscular tenderness  Thyroid: No thyromegaly  Cardiovascular:      Rate and Rhythm: Normal rate and regular rhythm  Heart sounds: Normal heart sounds, S1 normal and S2 normal  No murmur  Pulmonary:      Effort: Pulmonary effort is normal  No respiratory distress  Breath sounds: Normal breath sounds  No wheezing, rhonchi or rales  Abdominal:      General: Bowel sounds are normal  There is no distension  Palpations: Abdomen is soft  There is no mass  Tenderness: There is no abdominal tenderness  There is no guarding or rebound  Hernia: No hernia is present  There is no hernia in the left inguinal area or right inguinal area  Genitourinary:     Pubic Area: Rash present  Penis: Normal        Scrotum/Testes: Normal       Epididymis:      Right: Normal       Left: Normal    Musculoskeletal: Normal range of motion  General: No swelling, tenderness, deformity or signs of injury  Lymphadenopathy:      Cervical: No cervical adenopathy  Lower Body: No right inguinal adenopathy  No left inguinal adenopathy  Skin:     General: Skin is warm and dry  Capillary Refill: Capillary refill takes less than 2 seconds  Coloration: Skin is not jaundiced or pale  Findings: No bruising or erythema  Neurological:      General: No focal deficit present  Mental Status: He is alert and oriented to person, place, and time  Mental status is at baseline  Deep Tendon Reflexes: Reflexes are normal and symmetric   Reflexes normal    Psychiatric:         Behavior: Behavior normal          Thought Content: Thought content normal          Judgment: Judgment normal        BMI Counseling: Body mass index is 31 44 kg/m²  The BMI is above normal  Nutrition recommendations include reducing portion sizes, decreasing overall calorie intake, consuming healthier snacks, moderation in carbohydrate intake and reducing intake of saturated fat and trans fat  Exercise recommendations include exercising 3-5 times per week

## 2020-09-25 NOTE — ASSESSMENT & PLAN NOTE
Allergy symptoms are noted with congestion of the sinus membranes as well as bilateral tympanic membrane bulge    He has been instructed to start Claritin 10 mg daily and to add fluticasone nasal spray if the Claritin alone does not relieve his symptoms

## 2020-10-05 ENCOUNTER — OFFICE VISIT (OUTPATIENT)
Dept: PODIATRY | Facility: CLINIC | Age: 40
End: 2020-10-05

## 2020-10-05 VITALS — HEIGHT: 71 IN | BODY MASS INDEX: 31.44 KG/M2 | TEMPERATURE: 97.5 F

## 2020-10-05 DIAGNOSIS — L60.3 NAIL DYSTROPHY: Primary | ICD-10-CM

## 2020-10-05 PROCEDURE — NCFTCARE PR NON-COVERED FOOT CARE: Performed by: PODIATRIST

## 2020-11-13 DIAGNOSIS — M54.12 CERVICAL RADICULOPATHY: Primary | ICD-10-CM

## 2020-11-13 RX ORDER — CYCLOBENZAPRINE HCL 5 MG
5 TABLET ORAL
Qty: 10 TABLET | Refills: 0 | Status: SHIPPED | OUTPATIENT
Start: 2020-11-13 | End: 2020-11-24 | Stop reason: SDUPTHER

## 2020-11-13 RX ORDER — PREDNISONE 1 MG/1
TABLET ORAL
Qty: 42 TABLET | Refills: 0 | Status: SHIPPED | OUTPATIENT
Start: 2020-11-13 | End: 2020-11-24 | Stop reason: SDUPTHER

## 2020-11-17 ENCOUNTER — OFFICE VISIT (OUTPATIENT)
Dept: PODIATRY | Facility: CLINIC | Age: 40
End: 2020-11-17

## 2020-11-17 VITALS — BODY MASS INDEX: 32.26 KG/M2 | HEIGHT: 71 IN | WEIGHT: 230.4 LBS | TEMPERATURE: 97.3 F

## 2020-11-17 DIAGNOSIS — L60.3 NAIL DYSTROPHY: Primary | ICD-10-CM

## 2020-11-17 PROCEDURE — NCFTCARE PR NON-COVERED FOOT CARE: Performed by: PODIATRIST

## 2020-11-24 DIAGNOSIS — M54.12 CERVICAL RADICULOPATHY: ICD-10-CM

## 2020-11-24 RX ORDER — PREDNISONE 1 MG/1
TABLET ORAL
Qty: 42 TABLET | Refills: 0 | Status: SHIPPED | OUTPATIENT
Start: 2020-11-24 | End: 2021-08-11 | Stop reason: ALTCHOICE

## 2020-11-24 RX ORDER — CYCLOBENZAPRINE HCL 5 MG
5 TABLET ORAL
Qty: 10 TABLET | Refills: 0 | Status: SHIPPED | OUTPATIENT
Start: 2020-11-24 | End: 2021-05-12 | Stop reason: SDUPTHER

## 2020-12-29 ENCOUNTER — OFFICE VISIT (OUTPATIENT)
Dept: PODIATRY | Facility: CLINIC | Age: 40
End: 2020-12-29

## 2020-12-29 VITALS — HEIGHT: 71 IN | WEIGHT: 230 LBS | BODY MASS INDEX: 32.2 KG/M2

## 2020-12-29 DIAGNOSIS — L60.3 NAIL DYSTROPHY: Primary | ICD-10-CM

## 2020-12-29 PROCEDURE — NCFTCARE PR NON-COVERED FOOT CARE: Performed by: PODIATRIST

## 2021-01-14 DIAGNOSIS — B36.9 FUNGAL SKIN INFECTION: Primary | ICD-10-CM

## 2021-01-14 RX ORDER — CLOTRIMAZOLE AND BETAMETHASONE DIPROPIONATE 10; .5 MG/ML; MG/ML
LOTION TOPICAL 2 TIMES DAILY
Qty: 30 ML | Refills: 0 | Status: SHIPPED | OUTPATIENT
Start: 2021-01-14 | End: 2021-11-03 | Stop reason: SDUPTHER

## 2021-01-14 NOTE — PROGRESS NOTES
It shin skin irritation on the scrotum seems to be likely a fungal issue will start Lotrisone lotion twice a day to resolved

## 2021-02-04 ENCOUNTER — OFFICE VISIT (OUTPATIENT)
Dept: PODIATRY | Facility: CLINIC | Age: 41
End: 2021-02-04

## 2021-02-04 VITALS — HEIGHT: 71 IN | WEIGHT: 235.4 LBS | BODY MASS INDEX: 32.96 KG/M2

## 2021-02-04 DIAGNOSIS — L60.3 NAIL DYSTROPHY: Primary | ICD-10-CM

## 2021-02-04 PROCEDURE — NCFTCARE PR NON-COVERED FOOT CARE: Performed by: PODIATRIST

## 2021-02-04 NOTE — PROGRESS NOTES
Patient presents for palliative foot care  No acute disorder noted  Treatment consisted of nail trimming  Pedal pulses are within normal limits

## 2021-03-18 ENCOUNTER — OFFICE VISIT (OUTPATIENT)
Dept: PODIATRY | Facility: CLINIC | Age: 41
End: 2021-03-18

## 2021-03-18 VITALS
WEIGHT: 235 LBS | HEIGHT: 71 IN | BODY MASS INDEX: 32.9 KG/M2 | DIASTOLIC BLOOD PRESSURE: 84 MMHG | HEART RATE: 80 BPM | SYSTOLIC BLOOD PRESSURE: 130 MMHG

## 2021-03-18 DIAGNOSIS — L60.3 NAIL DYSTROPHY: Primary | ICD-10-CM

## 2021-03-18 PROCEDURE — NCFTCARE PR NON-COVERED FOOT CARE: Performed by: PODIATRIST

## 2021-04-29 ENCOUNTER — OFFICE VISIT (OUTPATIENT)
Dept: PODIATRY | Facility: CLINIC | Age: 41
End: 2021-04-29

## 2021-04-29 VITALS
HEART RATE: 76 BPM | HEIGHT: 71 IN | SYSTOLIC BLOOD PRESSURE: 131 MMHG | WEIGHT: 229 LBS | BODY MASS INDEX: 32.06 KG/M2 | DIASTOLIC BLOOD PRESSURE: 81 MMHG

## 2021-04-29 DIAGNOSIS — L60.3 NAIL DYSTROPHY: Primary | ICD-10-CM

## 2021-04-29 PROCEDURE — NCFTCARE PR NON-COVERED FOOT CARE: Performed by: PODIATRIST

## 2021-04-29 NOTE — PROGRESS NOTES
Patient presents for palliative nail care  No acute disorder noted  Treatment consisted of nail trimming

## 2021-05-12 DIAGNOSIS — M54.12 CERVICAL RADICULOPATHY: ICD-10-CM

## 2021-05-12 RX ORDER — CYCLOBENZAPRINE HCL 5 MG
5 TABLET ORAL
Qty: 10 TABLET | Refills: 0 | Status: SHIPPED | OUTPATIENT
Start: 2021-05-12

## 2021-06-01 DIAGNOSIS — M25.562 ACUTE PAIN OF LEFT KNEE: Primary | ICD-10-CM

## 2021-06-01 RX ORDER — NAPROXEN 500 MG/1
500 TABLET ORAL 2 TIMES DAILY WITH MEALS
Qty: 14 TABLET | Refills: 0 | Status: SHIPPED | OUTPATIENT
Start: 2021-06-01 | End: 2022-06-08 | Stop reason: SDUPTHER

## 2021-06-01 NOTE — PROGRESS NOTES
Patient called with left knee pain and stiffness  Denies any recent trauma other than regular exercise    Will try naproxen 500 mg twice a day for 1 week follow-up in office if not improved

## 2021-06-10 ENCOUNTER — OFFICE VISIT (OUTPATIENT)
Dept: PODIATRY | Facility: CLINIC | Age: 41
End: 2021-06-10

## 2021-06-10 VITALS
BODY MASS INDEX: 32.53 KG/M2 | SYSTOLIC BLOOD PRESSURE: 127 MMHG | HEART RATE: 78 BPM | WEIGHT: 232.4 LBS | HEIGHT: 71 IN | DIASTOLIC BLOOD PRESSURE: 86 MMHG

## 2021-06-10 DIAGNOSIS — L60.3 NAIL DYSTROPHY: Primary | ICD-10-CM

## 2021-06-10 PROCEDURE — NCFTCARE PR NON-COVERED FOOT CARE: Performed by: PODIATRIST

## 2021-07-27 ENCOUNTER — OFFICE VISIT (OUTPATIENT)
Dept: PODIATRY | Facility: CLINIC | Age: 41
End: 2021-07-27

## 2021-07-27 VITALS
DIASTOLIC BLOOD PRESSURE: 77 MMHG | SYSTOLIC BLOOD PRESSURE: 133 MMHG | WEIGHT: 237.6 LBS | HEIGHT: 71 IN | HEART RATE: 78 BPM | BODY MASS INDEX: 33.26 KG/M2

## 2021-07-27 DIAGNOSIS — L60.3 NAIL DYSTROPHY: Primary | ICD-10-CM

## 2021-07-27 PROCEDURE — NCFTCARE PR NON-COVERED FOOT CARE: Performed by: PODIATRIST

## 2021-08-04 DIAGNOSIS — J01.00 ACUTE NON-RECURRENT MAXILLARY SINUSITIS: Primary | ICD-10-CM

## 2021-08-04 RX ORDER — AZITHROMYCIN 250 MG/1
TABLET, FILM COATED ORAL
Qty: 6 TABLET | Refills: 0 | Status: SHIPPED | OUTPATIENT
Start: 2021-08-04 | End: 2021-08-09

## 2021-08-04 NOTE — PROGRESS NOTES
Patient called with ear discomfort with pain radiating down the neck below the year of some headache and sweats last night suspect most likely a bacterial infection with eustachian tube congestion plan is to start a 5 day Zithromax pack    Follow-up if symptoms do not improve

## 2021-09-21 ENCOUNTER — RA CDI HCC (OUTPATIENT)
Dept: OTHER | Facility: HOSPITAL | Age: 41
End: 2021-09-21

## 2021-09-21 NOTE — PROGRESS NOTES
[facility] 76  coding opportunities       Chart reviewed, no opportunity found: CHART REVIEWED, NO OPPORTUNITY FOUND                        Patients insurance company: [employer] (Medicare Advantage and Commercial)

## 2021-10-19 ENCOUNTER — OFFICE VISIT (OUTPATIENT)
Dept: PODIATRY | Facility: CLINIC | Age: 41
End: 2021-10-19

## 2021-10-19 VITALS
HEIGHT: 71 IN | HEART RATE: 80 BPM | BODY MASS INDEX: 33.18 KG/M2 | SYSTOLIC BLOOD PRESSURE: 133 MMHG | DIASTOLIC BLOOD PRESSURE: 88 MMHG | WEIGHT: 237 LBS

## 2021-10-19 DIAGNOSIS — L60.3 NAIL DYSTROPHY: Primary | ICD-10-CM

## 2021-10-19 PROCEDURE — NCFTCARE PR NON-COVERED FOOT CARE: Performed by: PODIATRIST

## 2021-10-19 RX ORDER — BETAMETHASONE DIPROPIONATE 0.5 MG/G
CREAM TOPICAL
COMMUNITY
Start: 2021-10-06

## 2021-11-30 ENCOUNTER — OFFICE VISIT (OUTPATIENT)
Dept: PODIATRY | Facility: CLINIC | Age: 41
End: 2021-11-30

## 2021-11-30 VITALS
HEART RATE: 80 BPM | BODY MASS INDEX: 33.04 KG/M2 | HEIGHT: 71 IN | SYSTOLIC BLOOD PRESSURE: 141 MMHG | WEIGHT: 236 LBS | DIASTOLIC BLOOD PRESSURE: 87 MMHG

## 2021-11-30 DIAGNOSIS — L60.3 NAIL DYSTROPHY: Primary | ICD-10-CM

## 2021-11-30 PROCEDURE — NCFTCARE PR NON-COVERED FOOT CARE: Performed by: PODIATRIST

## 2022-01-11 ENCOUNTER — OFFICE VISIT (OUTPATIENT)
Dept: PODIATRY | Facility: CLINIC | Age: 42
End: 2022-01-11

## 2022-01-11 VITALS
HEART RATE: 74 BPM | WEIGHT: 247 LBS | DIASTOLIC BLOOD PRESSURE: 86 MMHG | SYSTOLIC BLOOD PRESSURE: 135 MMHG | BODY MASS INDEX: 34.58 KG/M2 | HEIGHT: 71 IN

## 2022-01-11 DIAGNOSIS — L60.3 NAIL DYSTROPHY: Primary | ICD-10-CM

## 2022-01-11 PROCEDURE — NCFTCARE PR NON-COVERED FOOT CARE: Performed by: PODIATRIST

## 2022-01-11 NOTE — PROGRESS NOTES
Patient presents for palliative foot care  Pedal pulses are within normal limits  No acute disorder noted  Treatment consisted of nail trimming

## 2022-02-22 ENCOUNTER — OFFICE VISIT (OUTPATIENT)
Dept: PODIATRY | Facility: CLINIC | Age: 42
End: 2022-02-22

## 2022-02-22 VITALS
DIASTOLIC BLOOD PRESSURE: 97 MMHG | HEIGHT: 71 IN | BODY MASS INDEX: 33.68 KG/M2 | WEIGHT: 240.6 LBS | HEART RATE: 79 BPM | SYSTOLIC BLOOD PRESSURE: 153 MMHG

## 2022-02-22 DIAGNOSIS — L60.3 NAIL DYSTROPHY: Primary | ICD-10-CM

## 2022-02-22 PROCEDURE — NCFTCARE PR NON-COVERED FOOT CARE: Performed by: PODIATRIST

## 2022-02-22 NOTE — PROGRESS NOTES
Patient presents for palliative nail care  No acute disorder noted  Pedal pulses are within normal limits  Treatment consisted of nail trimming

## 2022-03-04 DIAGNOSIS — G62.9 NEUROPATHY: Primary | ICD-10-CM

## 2022-03-04 RX ORDER — GABAPENTIN 100 MG/1
100 CAPSULE ORAL
Qty: 30 CAPSULE | Refills: 1 | Status: SHIPPED | OUTPATIENT
Start: 2022-03-04

## 2022-03-04 NOTE — PROGRESS NOTES
Patient called today having pins and needle sensation in his foot similar to previous episode seems consistent with a neuropathy no history of trauma to the foot    Has will responded well to gabapentin in the past will start 100 mg daily at bedtime for 4 weeks

## 2022-04-05 ENCOUNTER — OFFICE VISIT (OUTPATIENT)
Dept: PODIATRY | Facility: CLINIC | Age: 42
End: 2022-04-05

## 2022-04-05 VITALS
WEIGHT: 236 LBS | DIASTOLIC BLOOD PRESSURE: 87 MMHG | HEIGHT: 71 IN | SYSTOLIC BLOOD PRESSURE: 127 MMHG | HEART RATE: 75 BPM | BODY MASS INDEX: 33.04 KG/M2

## 2022-04-05 DIAGNOSIS — L60.3 NAIL DYSTROPHY: Primary | ICD-10-CM

## 2022-04-05 PROCEDURE — NCFTCARE PR NON-COVERED FOOT CARE: Performed by: PODIATRIST

## 2022-05-17 ENCOUNTER — OFFICE VISIT (OUTPATIENT)
Dept: PODIATRY | Facility: CLINIC | Age: 42
End: 2022-05-17

## 2022-05-17 VITALS
DIASTOLIC BLOOD PRESSURE: 80 MMHG | SYSTOLIC BLOOD PRESSURE: 120 MMHG | BODY MASS INDEX: 32.62 KG/M2 | HEIGHT: 71 IN | WEIGHT: 233 LBS | HEART RATE: 73 BPM

## 2022-05-17 DIAGNOSIS — L60.3 NAIL DYSTROPHY: Primary | ICD-10-CM

## 2022-05-17 PROCEDURE — NCFTCARE PR NON-COVERED FOOT CARE: Performed by: PODIATRIST

## 2022-05-17 NOTE — PROGRESS NOTES
Patient presents for palliative nail care  No acute disorder noted  Treatment consisted of nail trimming  Pedal pulses are within normal limits  Dementia

## 2022-06-01 DIAGNOSIS — M25.569 ACUTE KNEE PAIN, UNSPECIFIED LATERALITY: Primary | ICD-10-CM

## 2022-06-08 ENCOUNTER — OFFICE VISIT (OUTPATIENT)
Dept: OBGYN CLINIC | Facility: CLINIC | Age: 42
End: 2022-06-08
Payer: COMMERCIAL

## 2022-06-08 ENCOUNTER — HOSPITAL ENCOUNTER (OUTPATIENT)
Dept: RADIOLOGY | Facility: HOSPITAL | Age: 42
Discharge: HOME | End: 2022-06-08
Payer: COMMERCIAL

## 2022-06-08 VITALS — HEART RATE: 88 BPM | OXYGEN SATURATION: 97 % | BODY MASS INDEX: 32.68 KG/M2 | WEIGHT: 233.4 LBS | HEIGHT: 71 IN

## 2022-06-08 DIAGNOSIS — M25.562 ACUTE PAIN OF LEFT KNEE: ICD-10-CM

## 2022-06-08 DIAGNOSIS — M25.562 LEFT KNEE PAIN, UNSPECIFIED CHRONICITY: Primary | ICD-10-CM

## 2022-06-08 DIAGNOSIS — M25.562 LEFT KNEE PAIN, UNSPECIFIED CHRONICITY: ICD-10-CM

## 2022-06-08 DIAGNOSIS — M25.569 ACUTE KNEE PAIN, UNSPECIFIED LATERALITY: ICD-10-CM

## 2022-06-08 DIAGNOSIS — M25.562 PATELLOFEMORAL ARTHRALGIA OF LEFT KNEE: ICD-10-CM

## 2022-06-08 PROCEDURE — 99203 OFFICE O/P NEW LOW 30 MIN: CPT | Performed by: PHYSICIAN ASSISTANT

## 2022-06-08 PROCEDURE — 3008F BODY MASS INDEX DOCD: CPT | Performed by: PHYSICIAN ASSISTANT

## 2022-06-08 PROCEDURE — 1036F TOBACCO NON-USER: CPT | Performed by: PHYSICIAN ASSISTANT

## 2022-06-08 PROCEDURE — 73562 X-RAY EXAM OF KNEE 3: CPT

## 2022-06-08 RX ORDER — NAPROXEN 500 MG/1
500 TABLET ORAL 2 TIMES DAILY WITH MEALS
Qty: 14 TABLET | Refills: 0 | Status: SHIPPED | OUTPATIENT
Start: 2022-06-08

## 2022-06-08 NOTE — PROGRESS NOTES
Assessment/Plan   Diagnoses and all orders for this visit:    Left knee pain, unspecified chronicity    Patellofemoral arthralgia  - Normal exam  - Start PT  - Naproxen refilled, take as needed with food  - Ice as needed  - Follow up with Dr Kimber Huang in 4-6 weeks        Subjective   Patient ID: Iftikhar Cherry is a 37 y o  male  Vitals:    06/08/22 1743   Pulse: 88   SpO2: 97%     42yo male comes in for an evaluation of his left knee  He has been having ongoing knee pain with no specific injury  He gets some vague (no specific location) pains in the knee, but his chief complaint is "I can't run "  He does other things, and had no difficulty golfing, but then felt like the couldn't get into a rhythm when he tried to go for a run  He mostly gets the pains when he walks down the stairs  No radiating pain or numbness  The following portions of the patient's history were reviewed and updated as appropriate: allergies, current medications, past family history, past medical history, past social history, past surgical history and problem list     Review of Systems  Ortho Exam  Past Medical History:   Diagnosis Date    Onychomycosis of toenail      History reviewed  No pertinent surgical history  Family History   Problem Relation Age of Onset    Pancreatic cancer Mother     Other Father         AORTIC VALVE DISORDER    Hypertension Family      Social History     Occupational History    Not on file   Tobacco Use    Smoking status: Never Smoker    Smokeless tobacco: Never Used   Vaping Use    Vaping Use: Never used   Substance and Sexual Activity    Alcohol use: Yes     Comment: OCCASIONAL    Drug use: No    Sexual activity: Not on file       Review of Systems   Constitutional: Negative  HENT: Negative  Eyes: Negative  Respiratory: Negative  Cardiovascular: Negative  Gastrointestinal: Negative  Endocrine: Negative  Genitourinary: Negative  Musculoskeletal: As below  Harvey Deutsch Allergic/Immunologic: Negative  Neurological: Negative  Hematological: Negative  Psychiatric/Behavioral: Negative  Objective   Physical Exam    · Constitutional: Awake, Alert, Oriented  · Eyes: EOMI  · Psych: Mood and affect appropriate  · Heart: regular rate   · Lungs: No audible wheezing  · Abdomen: No guarding  · Lymph: no lymphedema   left Knee:  - Appearance   No swelling, discoloration, deformity, or ecchymosis  - Effusion   none  - Palpation   No tenderness about the medial / lateral joint line, patella, patellar tendon, MCL, LCL, hamstrings, or medial / lateral tibial plateau   - ROM   Extension: 0 and Flexion: 130  - Special Tests   Manjula's Test negative, Lachman's Test negative, Anterior Drawer Test negative, Posterior Drawer Test negative, Valgus Stress Test negative, Varus Stress Test negative and Patellar apprehension negative  - Motor   normal 5/5 in all planes  - NVI distally    I have personally reviewed pertinent films in PACS and my interpretation is No acute displaced fracture  No severe OA  Tony Escoto

## 2022-06-28 DIAGNOSIS — M54.12 CERVICAL RADICULOPATHY: Primary | ICD-10-CM

## 2022-06-28 RX ORDER — CYCLOBENZAPRINE HCL 5 MG
5 TABLET ORAL
Qty: 10 TABLET | Refills: 0 | Status: SHIPPED | OUTPATIENT
Start: 2022-06-28

## 2022-06-28 RX ORDER — METHYLPREDNISOLONE 4 MG/1
TABLET ORAL
Qty: 21 EACH | Refills: 0 | Status: SHIPPED | OUTPATIENT
Start: 2022-06-28

## 2022-06-28 NOTE — PROGRESS NOTES
Numbness in hand has returned similar symptoms in the past and seemed consistent with a cervical radiculopathy    Will start him on a Medrol Dosepak and Flexeril 10 mg at bedtime for 10 days

## 2022-07-01 ENCOUNTER — EVALUATION (OUTPATIENT)
Dept: PHYSICAL THERAPY | Facility: CLINIC | Age: 42
End: 2022-07-01
Payer: COMMERCIAL

## 2022-07-01 DIAGNOSIS — M54.16 LUMBAR RADICULOPATHY: ICD-10-CM

## 2022-07-01 DIAGNOSIS — M25.562 LEFT KNEE PAIN, UNSPECIFIED CHRONICITY: Primary | ICD-10-CM

## 2022-07-01 PROCEDURE — 97162 PT EVAL MOD COMPLEX 30 MIN: CPT | Performed by: PHYSICAL THERAPIST

## 2022-07-01 PROCEDURE — 97110 THERAPEUTIC EXERCISES: CPT | Performed by: PHYSICAL THERAPIST

## 2022-07-01 PROCEDURE — 97112 NEUROMUSCULAR REEDUCATION: CPT | Performed by: PHYSICAL THERAPIST

## 2022-07-01 NOTE — PROGRESS NOTES
PT Evaluation     Today's date: 2022  Patient name: Tawanna Leblanc  : 1980  MRN: [id number]  Referring provider: Ted Suarez, *  Dx:   Encounter Diagnosis     ICD-10-CM    1  Left knee pain, unspecified chronicity  M25 562                   Assessment  Assessment details: Pt presents with signs and symptoms synonymous of admitting diagnosis as well as given his long history of Lumbar Radiculopathy he could very well also be having a referral symptoms manifesting in knee  There are several variables with this case as he is trending better, on steroids, and has a long history L sided Low back pain that did not directly present as an entirely knee problem  Will continue to monitor knee symptoms  Based on today's session he demonstrated Posterior derangement of LS with DP of REIL  Educated to continue with this and carefully proceed with activity due to his current dosage of Prednisone  Pt presents with pain, decreased strength, decreased range, neural tension, decreased flexibility, as well as tolerance to activity and postural awareness  Pt would benefit skilled PT intervention in order to address these impairments in order to be able to perform all desired activities with minimal to nil symptom exacerbation  If he fails to find improvement over the next 3-4 weeks he is scheduled with Dr David Resendiz on 2022    Thank you very much for this kind, motivated and familiar referral      Impairments: abnormal gait, abnormal or restricted ROM, activity intolerance, impaired balance, impaired physical strength, lacks appropriate home exercise program, pain with function, poor posture  and poor body mechanics  Understanding of Dx/Px/POC: good   Prognosis: good    Goals  STG 4 Weeks:  Decrease pain at worst to 4  Improve range to improve L knee to 130  Improve strength to L LE 4/5  Independent with HEP  LTG 8 Weeks:  Decrease pain at worst to 2  Improve range to L knee PT OP no pain  Improve strength to 5/5  Able to perform all desired activities with minimal to nil symptom exacerbation      Plan  Patient would benefit from: skilled physical therapy  Planned modality interventions: cryotherapy, TENS and thermotherapy: hydrocollator packs  Planned therapy interventions: abdominal trunk stabilization, activity modification, joint mobilization, manual therapy, neuromuscular re-education, patient education, postural training, strengthening, stretching, therapeutic activities, therapeutic exercise, therapeutic training, transfer training, home exercise program, graded motor, graded exercise, graded activity, functional ROM exercises, flexibility, behavior modification and body mechanics training  Frequency: 2x week  Duration in weeks: 10  Treatment plan discussed with: patient        Subjective Evaluation    History of Present Illness  Date of onset: 7/1/2022  Mechanism of injury: Pt is a 37 yomale who is familiar to this facility and presents today stating that his L knee pain developed insidiously after going for a run about a month ago  Pt reports that he tried to run and could not obtain rhythm at the time he felt like he was unable to bend all the way and turned around  Pt reports that he rested for 1 week and attempted again same problem  Pt reports that he needed to seek medical assistance  Pt reports that he called family physician who referred to ortho had imagery taken which was (-) of abnormality  Pt was prescribed prednisone and is feeling much better, he states he is half way through at this time  Pt reports initially prescribed naproxen without assistance  Pt reports that he is not feeling better with naproxen, he states he was also referred to PT by Darylene Silva PA and thus presents today  Pt reports that he is to meet with Dr Gurwinder Garcia on 8/1/2022    Pt reports that he is feeling overall he is progressing better, however his largest complaints at this time are bending the knee, he experiences mild tightness, there was a pain initially associated with it but meds have decreased it  Pt reports that prior to meds pain at worst 8/10, currently 3-4/10  Pt reports that his largest complaints at this time are bending as there seems to be tightness/restriction, with bending forward, squatting, going down stairs is more challenging then going up, Pt reports he does have a history of low back pain, it is going okay, and historically when he has issues it is L side, he cannot recall a time without at least a month of low back pain  Pt reports that at night the L leg symptoms to ankle are present with laying flat, however SL improved, once comfortable he is able to sleep  Pt reports that that he isn't if these symptoms are correlated  Pt reports that movement helps, but if sitting too long his back will begin to bother him  Pt reports that no buckling, locking, just restriction  Pt reports no R side symptoms, and no change in bowel or bladder  Pt reports goals are to reduce tightness, reduce pain levels, improve ability to return to recreational running and more comfort with performance of elevations     Quality of life: good    Pain  Current pain ratin  At best pain ratin  At worst pain ratin  Quality: dull ache, sharp and tight  Relieving factors: medications and rest  Aggravating factors: running and stair climbing  Progression: improved (Steroids  )      Diagnostic Tests  X-ray: normal  Treatments  Previous treatment: medication  Patient Goals  Patient goals for therapy: increased strength, return to sport/leisure activities, increased motion and decreased pain          Objective     Active Range of Motion   Left Knee   Flexion: 125 (PT OP) degrees with pain  Extension: 0 degrees     Right Knee   Flexion: 130 degrees   Extension: 0 degrees     Additional Active Range of Motion Details  Forward head, rounded shoulders, Lordosis  Genu Valgum L > R  B/L Sensation intact to L3,4,5,S1,S2  DTR Patella R 1+ L +2 Elle 2+ B  Postural correction   Repeated motion // ROM  Flex min   Ext Min  SB R Min*  SB L Min*  LE Strength  Hip   L Flex 4- Ext 5 Abd 5 Add 5  R Flex 5 Ext 5 Abd 5 Add 5  LE Screen  Knee 5/5 B  Ankle DF 5/5 B, PF 5/5 B, GToe Ext 4- L, 5/5 R  Joint Mobility  Palpation  Sts  Slump ST +  L knee Flexion OP  B  Forward leaning Squat to  Item* B  Running is good today  Stairs B going down  LE Repeated Movements  Unloaded knee Ext NE    Trial to LS symptoms  LS REIL / LS REIL Pt OP, REIL PT OP, B L knee ROM, PT OP of L knee flexion, better Squat leaning forward, better Stairs  Flowsheet Rows    Flowsheet Row Most Recent Value   PT/OT G-Codes    Current Score 65   Projected Score 82             Precautions: Nil      Manuals 7/1                                                                Neuro Re-Ed             Postural Ed, + medication usage, activity progression 10 Min                                                                                          Ther Ex             REIL D B (HEP)            REIL Pt OP performed discuss post meds           REIL PT OP Performed Discuss post meds           Progression ?                                                     Slump Slider L  1"           Ther Activity             LS range/LE Screen             Knee Flexion PT OP B             item Squat B            Running Minimal Symptoms/B            Stairs Down B            Modalities

## 2022-07-08 ENCOUNTER — OFFICE VISIT (OUTPATIENT)
Dept: PHYSICAL THERAPY | Facility: CLINIC | Age: 42
End: 2022-07-08
Payer: COMMERCIAL

## 2022-07-08 DIAGNOSIS — M25.562 LEFT KNEE PAIN, UNSPECIFIED CHRONICITY: Primary | ICD-10-CM

## 2022-07-08 DIAGNOSIS — M54.16 LUMBAR RADICULOPATHY: ICD-10-CM

## 2022-07-08 PROCEDURE — 97110 THERAPEUTIC EXERCISES: CPT

## 2022-07-08 PROCEDURE — 97530 THERAPEUTIC ACTIVITIES: CPT

## 2022-07-08 PROCEDURE — 97112 NEUROMUSCULAR REEDUCATION: CPT

## 2022-07-08 NOTE — PROGRESS NOTES
Daily Note     Today's date: 2022  Patient name: Jenifer Alexis  : 1980  MRN: [id number]  Referring provider: Jacquelyn Jacobs, *  Dx:   Encounter Diagnosis     ICD-10-CM    1  Left knee pain, unspecified chronicity  M25 562    2  Lumbar radiculopathy  M54 16        Start Time: 08  Stop Time: 09  Total time in clinic (min): 38 minutes    Subjective: Pt reports to session feeling pretty well  He does have some posterior medial knee awareness with deep squatting  He finished his steroids earlier this week  Objective: See treatment diary below  Return to run program discussed: 10% rule for building, higher casey, walk/run  Assessment: Tolerated treatment well  Some catching during deep squatting  No issue running today  Patient demonstrated fatigue post treatment      Plan: Continue per plan of care  Precautions: Nil      Manuals  07/                                                               Neuro Re-Ed             Postural Ed, + medication usage, activity progression 10 Min 5 min            Hip ABD/ext  2x10                                                                            Ther Ex             REIL D B (HEP)            REIL Pt OP performed 6x10           REIL PT OP Performed            Progression ?                                                     Slump Slider L  1"x10            Ther Activity             LS range/LE Screen  5 quad  4 hamstring            Knee Flexion PT OP B Nil             item Squat B Mid range stop             Running Minimal Symptoms/B Nil            Stairs Down B nil           Modalities

## 2022-07-13 ENCOUNTER — APPOINTMENT (OUTPATIENT)
Dept: PHYSICAL THERAPY | Facility: CLINIC | Age: 42
End: 2022-07-13
Payer: COMMERCIAL

## 2022-07-14 ENCOUNTER — OFFICE VISIT (OUTPATIENT)
Dept: PODIATRY | Facility: CLINIC | Age: 42
End: 2022-07-14

## 2022-07-14 VITALS
HEIGHT: 71 IN | DIASTOLIC BLOOD PRESSURE: 86 MMHG | BODY MASS INDEX: 32.48 KG/M2 | WEIGHT: 232 LBS | SYSTOLIC BLOOD PRESSURE: 132 MMHG | HEART RATE: 81 BPM

## 2022-07-14 DIAGNOSIS — L60.3 NAIL DYSTROPHY: Primary | ICD-10-CM

## 2022-07-14 PROCEDURE — NCFTCARE PR NON-COVERED FOOT CARE: Performed by: PODIATRIST

## 2022-07-14 NOTE — PROGRESS NOTES
Patient presents for palliative foot care  No acute disorder noted  Pedal pulses are within normal limits  Treatment consists of nail trimming  Reappoint 6 weeks

## 2022-07-18 ENCOUNTER — OFFICE VISIT (OUTPATIENT)
Dept: PHYSICAL THERAPY | Facility: CLINIC | Age: 42
End: 2022-07-18
Payer: COMMERCIAL

## 2022-07-18 DIAGNOSIS — M25.562 LEFT KNEE PAIN, UNSPECIFIED CHRONICITY: Primary | ICD-10-CM

## 2022-07-18 DIAGNOSIS — M54.16 LUMBAR RADICULOPATHY: ICD-10-CM

## 2022-07-18 PROCEDURE — 97140 MANUAL THERAPY 1/> REGIONS: CPT | Performed by: PHYSICAL THERAPIST

## 2022-07-18 PROCEDURE — 97110 THERAPEUTIC EXERCISES: CPT | Performed by: PHYSICAL THERAPIST

## 2022-07-18 NOTE — PROGRESS NOTES
Daily Note     Today's date: 2022  Patient name: Ale Anderson  : 1980  MRN: [id number]  Referring provider: Joann Hilario, *  Dx:   Encounter Diagnosis     ICD-10-CM    1  Left knee pain, unspecified chronicity  M25 562    2  Lumbar radiculopathy  M54 16                   Subjective: Pt presents today stating that he is feeling fairly well, about 85-90% of his knee has recovered  His back has been treated well, no complaints  Reports up to 1 5 miles in running, felt better the longer distance he did, the better he was  HEP 2-3x's a day  Objective: See treatment diary below  Presents with nil Symptoms in R Knee  Pain with Narrow base Squat  B  Pain with resisted knee flex  B    Assessment: PT OP and Sustained ext offered patients knee pain to continue to decrease as well as strength improve  Pt educated about how to offer more force during his HEP and encouraged to perform more frequently if possible  Pt in complete accord  Plan: Continue per plan of care  Precautions: Nil      Manuals           REIL PT OP   NB          PT OP   D B                                    Neuro Re-Ed             Postural Ed, + medication usage, activity progression 10 Min 5 min  5 min          Hip ABD/ext  2x10 2 x 10                                                                           Ther Ex             REIL D B (HEP)            REIL Pt OP performed 6x10 3  x10          REIL PT OP Performed  See MT          Progression ?                                        Sustained Ext   6 min          Slump Slider L  1"x10  1" x 10          Ther Activity             LS range/LE Screen  5 quad  4 hamstring  5/5 B Ham and Quad          Knee Flexion PT OP B Nil             item Squat B Mid range stop   B          Running Minimal Symptoms/B Nil  nil          Stairs Down B nil  nil          Modalities

## 2022-07-20 ENCOUNTER — APPOINTMENT (OUTPATIENT)
Dept: PHYSICAL THERAPY | Facility: CLINIC | Age: 42
End: 2022-07-20
Payer: COMMERCIAL

## 2022-07-22 ENCOUNTER — APPOINTMENT (OUTPATIENT)
Dept: PHYSICAL THERAPY | Facility: CLINIC | Age: 42
End: 2022-07-22
Payer: COMMERCIAL

## 2022-07-25 ENCOUNTER — OFFICE VISIT (OUTPATIENT)
Dept: PHYSICAL THERAPY | Facility: CLINIC | Age: 42
End: 2022-07-25
Payer: COMMERCIAL

## 2022-07-25 DIAGNOSIS — M25.562 LEFT KNEE PAIN, UNSPECIFIED CHRONICITY: ICD-10-CM

## 2022-07-25 DIAGNOSIS — M54.16 LUMBAR RADICULOPATHY: Primary | ICD-10-CM

## 2022-07-25 PROCEDURE — 97110 THERAPEUTIC EXERCISES: CPT | Performed by: PHYSICAL THERAPIST

## 2022-07-25 PROCEDURE — 97112 NEUROMUSCULAR REEDUCATION: CPT | Performed by: PHYSICAL THERAPIST

## 2022-07-25 NOTE — PROGRESS NOTES
Daily Note     Today's date: 2022  Patient name: Vijaya Booth  : 1980  MRN: [id number]  Referring provider: Ron Perez, *  Dx:   Encounter Diagnosis     ICD-10-CM    1  Lumbar radiculopathy  M54 16    2  Left knee pain, unspecified chronicity  M25 562                   Subjective: Pt presents today stating that he has been feeling pretty well  Pt reports that he had a very busy weekend with a large event, but the knee held out well, mild stiffness one day  Mild irritation with back at end of day  Very content with his progression  Objective: See treatment diary below  Presents with Nil Symptom  Assessment: Able to proceed with further sustained ext  Pt is progressing very well as a whole, no pain t/o entire session  RE n v       Precautions: Nil      Manuals          REIL PT OP   NB          PT OP   D B D B                                   Neuro Re-Ed             Postural Ed, + medication usage, activity progression 10 Min 5 min  5 min 8 min + Assessment  Hip ABD/ext  2x10 2 x 10                                                                           Ther Ex             REIL D B (HEP)            REIL Pt OP performed 6x10 3  x10 3  X 10         REIL PT OP Performed  See MT          Progression ?                                        Sustained Ext   6 min 8 min         Slump Slider L  1"x10  1" x 10 1" x 10          Ther Activity             LS range/LE Screen  5 quad  4 hamstring  5/5 B Ham and Quad 5/5 Ham/Quad         Knee Flexion PT OP B Nil             item Squat B Mid range stop   B B         Running Minimal Symptoms/B Nil  nil nil         Stairs Down B nil  nil nil         Modalities

## 2022-07-28 ENCOUNTER — APPOINTMENT (OUTPATIENT)
Dept: PHYSICAL THERAPY | Facility: CLINIC | Age: 42
End: 2022-07-28
Payer: COMMERCIAL

## 2022-08-01 ENCOUNTER — OFFICE VISIT (OUTPATIENT)
Dept: OBGYN CLINIC | Facility: CLINIC | Age: 42
End: 2022-08-01
Payer: COMMERCIAL

## 2022-08-01 VITALS
HEART RATE: 75 BPM | WEIGHT: 232 LBS | SYSTOLIC BLOOD PRESSURE: 130 MMHG | DIASTOLIC BLOOD PRESSURE: 80 MMHG | HEIGHT: 71 IN | BODY MASS INDEX: 32.48 KG/M2

## 2022-08-01 DIAGNOSIS — M25.562 CHRONIC PAIN OF LEFT KNEE: Primary | ICD-10-CM

## 2022-08-01 DIAGNOSIS — G89.29 CHRONIC PAIN OF LEFT KNEE: Primary | ICD-10-CM

## 2022-08-01 DIAGNOSIS — M54.50 CHRONIC LEFT-SIDED LOW BACK PAIN WITHOUT SCIATICA: ICD-10-CM

## 2022-08-01 DIAGNOSIS — G89.29 CHRONIC LEFT-SIDED LOW BACK PAIN WITHOUT SCIATICA: ICD-10-CM

## 2022-08-01 PROCEDURE — 99214 OFFICE O/P EST MOD 30 MIN: CPT | Performed by: PHYSICAL MEDICINE & REHABILITATION

## 2022-08-01 NOTE — PROGRESS NOTES
1  Chronic pain of left knee     2  Chronic left-sided low back pain without sciatica       No orders of the defined types were placed in this encounter  Impression:  Left knee pain likely secondary to pain syndrome and sacroiliac joint dysfunction  The patient has improved with physical therapy and has returned to most of his physical activity  He would benefit from a continued PT and home exercise program   I will see him back if needed  Imaging Studies (I personally reviewed images in PACS and report):  Left knee x-rays most recent to this encounter reviewed  These images show joint space narrowing with subchondral sclerosis release findings are consistent with Creta Miracle grade 1 osteoarthritis  No acute osseous abnormalities  The patient's pertinent emergency department/urgent care/referring physician's notes were reviewed  Return if symptoms worsen or fail to improve  Patient is in agreement with the above plan  HPI:  Mina Reyes is a 37 y o  male  who presents for evaluation of   Chief Complaint   Patient presents with    Left Knee - Pain       Onset/Mechanism: Chronic pain without an injury  Location: Knee  Radiation: Denies  Provocative: Physical activity  Severity: Improving with PT  Associated Symptoms: Squatting is tough  Treatment so far: Physical therapy  Following history reviewed and updated:  Past Medical History:   Diagnosis Date    Onychomycosis of toenail      History reviewed  No pertinent surgical history    Social History   Social History     Substance and Sexual Activity   Alcohol Use Yes    Comment: OCCASIONAL     Social History     Substance and Sexual Activity   Drug Use No     Social History     Tobacco Use   Smoking Status Never Smoker   Smokeless Tobacco Never Used     Family History   Problem Relation Age of Onset    Pancreatic cancer Mother     Other Father         AORTIC VALVE DISORDER    Hypertension Family      Allergies Allergen Reactions    Penicillins     Sulfa Antibiotics Other (See Comments)     UNK  UNK        Constitutional:  /80 (BP Location: Left arm, Patient Position: Sitting, Cuff Size: Adult)   Pulse 75   Ht 5' 11" (1 803 m)   Wt 105 kg (232 lb)   BMI 32 36 kg/m²    General: NAD  Eyes: Anicteric sclerae  Neck: Supple  Lungs: Unlabored breathing  Cardiovascular: No lower extremity edema  Skin: Intact without erythema  Neurologic: Sensation intact to light touch  Psychiatric: Mood and affect are appropriate  Left Knee Exam     Muscle Strength   The patient has normal left knee strength  Tenderness   The patient is experiencing no tenderness  Range of Motion   The patient has normal left knee ROM  Tests   Manjula:  Medial - negative Lateral - negative  Varus: negative Valgus: negative    Other   Erythema: absent  Scars: absent  Sensation: normal  Pulse: present  Swelling: none  Effusion: no effusion present      Back Exam     Muscle Strength   The patient has normal back strength      Reflexes   Patellar: 3/4  Achilles: 2/4    Other   Sensation: normal  Gait: normal     Comments:  Positive left Eddi's test              Procedures

## 2022-08-22 ENCOUNTER — TELEPHONE (OUTPATIENT)
Dept: PODIATRY | Facility: CLINIC | Age: 42
End: 2022-08-22

## 2022-08-25 ENCOUNTER — OFFICE VISIT (OUTPATIENT)
Dept: PODIATRY | Facility: CLINIC | Age: 42
End: 2022-08-25
Payer: COMMERCIAL

## 2022-08-25 VITALS
SYSTOLIC BLOOD PRESSURE: 134 MMHG | HEIGHT: 71 IN | WEIGHT: 232 LBS | HEART RATE: 77 BPM | BODY MASS INDEX: 32.48 KG/M2 | DIASTOLIC BLOOD PRESSURE: 88 MMHG

## 2022-08-25 DIAGNOSIS — L60.3 NAIL DYSTROPHY: Primary | ICD-10-CM

## 2022-08-25 PROCEDURE — 99212 OFFICE O/P EST SF 10 MIN: CPT | Performed by: PODIATRIST

## 2022-08-25 NOTE — PROGRESS NOTES
Patient presents for palliative care  No acute disorder noted  Treatment consisted of nail trimming  Pedal pulses are palpable

## 2022-08-29 ENCOUNTER — EVALUATION (OUTPATIENT)
Dept: PHYSICAL THERAPY | Facility: CLINIC | Age: 42
End: 2022-08-29
Payer: COMMERCIAL

## 2022-08-29 DIAGNOSIS — M54.16 LUMBAR RADICULOPATHY: Primary | ICD-10-CM

## 2022-08-29 DIAGNOSIS — M25.562 ACUTE PAIN OF LEFT KNEE: ICD-10-CM

## 2022-08-29 PROCEDURE — 97110 THERAPEUTIC EXERCISES: CPT | Performed by: PHYSICAL THERAPIST

## 2022-08-29 PROCEDURE — 97112 NEUROMUSCULAR REEDUCATION: CPT | Performed by: PHYSICAL THERAPIST

## 2022-08-29 NOTE — PROGRESS NOTES
PT Evaluation     Today's date: 2022  Patient name: Kenneth Paez  : 1980  MRN: [id number]  Referring provider: Jaswinder Can PT  Dx:   Encounter Diagnosis     ICD-10-CM    1  Lumbar radiculopathy  M54 16    2  Acute pain of left knee  M25 562                   Assessment  Assessment details: Pt at this time demonstrates almost complete resolution of his L knee  He appears to have had a slight re-derangement of his low back history and this is quite possibly due to the increased sitting and vacationing he had over the last 2-3 weeks  At this time pt would benefit specifically for continued intervention and education of his posterior derangement that has DP of REIL with OP and sustained Ext  If he is to have a decline or failure to further improve he may benefit an appropriate referral, but based on today's session and discussion of increasing force and repetition into his active lifestyle will be most appropriate intervention  Pt in accord of this plan    Thank you very much for this kind, familiar and motivated referral     Impairments: abnormal gait, abnormal or restricted ROM, activity intolerance, impaired balance, impaired physical strength, lacks appropriate home exercise program, pain with function, poor posture  and poor body mechanics  Understanding of Dx/Px/POC: good   Prognosis: good    Goals  STG 4 Weeks:  Decrease pain at worst to 4 -met  For back to 4  Improve range to improve L knee to 130 -met for back improve to nil  Improve strength to L LE 4/5 -met  Independent with HEP -met  LTG 8 Weeks:  Decrease pain at worst to 2 -met  Improve range to L knee PT OP no pain -met  Improve strength to 5/5   -met  Able to perform all desired activities with minimal to nil symptom exacerbation      Plan  Patient would benefit from: skilled physical therapy  Planned modality interventions: cryotherapy, TENS and thermotherapy: hydrocollator packs  Planned therapy interventions: abdominal trunk stabilization, activity modification, joint mobilization, manual therapy, neuromuscular re-education, patient education, postural training, strengthening, stretching, therapeutic activities, therapeutic exercise, therapeutic training, transfer training, home exercise program, graded motor, graded exercise, graded activity, functional ROM exercises, flexibility, behavior modification and body mechanics training  Frequency: 2x week  Duration in weeks: 10  Treatment plan discussed with: patient        Subjective Evaluation    History of Present Illness  Date of onset: 2022  Mechanism of injury: Pt presents today for the first time since 2022, he states that the knee has been feeling really well  He has been performing lots of recreational exercise, running, sports and is overall feeling much better  Pt reports that all the driving irritated his low back and he has been having some issues with his low back, and L sciatica to L foot, states he is having some R ankle irritation, his back symptoms have increased to 5-6/10  Pt reports with his knee, he has been feeling really well, at worst is 2/10, but overall very content with his progression  Pt reports that the pain after sleeping is gone, and as the day progresses his symptoms increase  The follow up with Dr Deborah Mcgee went well, no need for follow up unless there is a decline  Pt reports his goals at this time are to be able to further take control of his recent back flare up and improve his ability to sit for extended periods of time     Quality of life: good    Pain  Current pain ratin  At best pain ratin  At worst pain ratin  Quality: dull ache, sharp and tight  Relieving factors: medications and rest  Aggravating factors: running and stair climbing  Progression: improved (Steroids  )      Diagnostic Tests  X-ray: normal  Treatments  Previous treatment: medication  Patient Goals  Patient goals for therapy: increased strength, return to sport/leisure activities, increased motion and decreased pain          Objective     Active Range of Motion   Left Knee   Flexion: 130 (PT OP) degrees   Extension: 0 degrees     Right Knee   Flexion: 130 degrees   Extension: 0 degrees     Additional Active Range of Motion Details  Forward head, rounded shoulders, Lordosis  Genu Valgum L > R  B/L Sensation intact to L3,4,5,S1,S2  DTR Patella R 1+ L +2 Elle 2+ B  Postural correction   Repeated motion // ROM  Flex nil   Ext Min  SB R Min  SB L nil  LE Strength  Hip   L Flex 5 Ext 5 Abd 5 Add 5  R Flex 5 Ext 5 Abd 5 Add 5  LE Screen  Knee 5/5 B  Ankle DF 5/5 B, PF 5/5 B, GToe Ext 5B, 5/5 R  Joint Mobility  Palpation  Sts  Slump ST + (-)   L knee Flexion OP  B /A   Forward leaning Squat to  Item* B / A  Running is good today   / A  Stairs B going down   /A     LE Repeated Movements  Unloaded knee Ext NE    Trial to LS symptoms  LS REIL / LS REIL Pt OP, REIL PT OP, B L knee ROM, PT OP of L knee flexion, better Squat leaning forward, better Stairs  (remains asymptomatic s/p Sustained Ext  Precautions: Nil      Manuals 7/1 07/08 7/18 7/25 8/29        REIL PT OP   NB          PT OP   D B D B nv? Prone PA Mobs     nv? Neuro Re-Ed             Postural Ed, + medication usage, activity progression 10 Min 5 min  5 min 8 min + Assessment  15 min + assessment, review of postural awareness        Hip ABD/ext  2x10 2 x 10                                                                           Ther Ex             REIL D B (HEP)            REIL Pt OP performed 6x10 3  x10 3  X 10 nv        REIL PT OP Performed  See MT  Review for HEP 4-8x's a day        Progression ?                                        Sustained Ext   6 min 8 min 10 min        Slump Slider L  1"x10  1" x 10 1" x 10  1" x 10        Ther Activity             LS range/LE Screen  5 quad  4 hamstring  5/5 B Ham and Quad 5/5 Ham/Quad 5/5        Knee Flexion PT OP B Nil             item Squat B Mid range stop   B B A        Running Minimal Symptoms/B Nil  nil nil nil        Stairs Down B nil  nil nil Nil         Modalities

## 2022-09-07 ENCOUNTER — OFFICE VISIT (OUTPATIENT)
Dept: PHYSICAL THERAPY | Facility: CLINIC | Age: 42
End: 2022-09-07
Payer: COMMERCIAL

## 2022-09-07 DIAGNOSIS — M25.562 ACUTE PAIN OF LEFT KNEE: ICD-10-CM

## 2022-09-07 DIAGNOSIS — M54.16 LUMBAR RADICULOPATHY: Primary | ICD-10-CM

## 2022-09-07 PROCEDURE — 97110 THERAPEUTIC EXERCISES: CPT

## 2022-09-07 PROCEDURE — 97112 NEUROMUSCULAR REEDUCATION: CPT

## 2022-09-07 NOTE — PROGRESS NOTES
Daily Note     Today's date: 2022  Patient name: Celestino Chisholm  : 1980  MRN: [id number]  Referring provider: Tyrell Sepulveda PT  Dx:   Encounter Diagnosis     ICD-10-CM    1  Lumbar radiculopathy  M54 16    2  Acute pain of left knee  M25 562        Start Time: 1700  Stop Time: 1735  Total time in clinic (min): 35 minutes    Subjective: Pt reports that he is feeling good will try playing basket ball tonight  Objective: See treatment diary below      Assessment: Tolerated treatment well  Responding well to sustained extension  Patient demonstrated fatigue post treatment      Plan: Continue per plan of care  Precautions: Nil      Manuals        REIL PT OP   NB          PT OP   D B D B nv? Prone PA Mobs     nv? Neuro Re-Ed             Postural Ed, + medication usage, activity progression 10 Min 5 min  5 min 8 min + Assessment  15 min + assessment, review of postural awareness 8 min        Hip ABD/ext  2x10 2 x 10                                                                           Ther Ex             REIL D B (HEP)            REIL Pt OP performed 6x10 3  x10 3  X 10 nv        REIL PT OP Performed  See MT  Review for HEP 4-8x's a day Belt OP    3x10       Progression ?                                        Sustained Ext   6 min 8 min 10 min 10 min       Slump Slider L  1"x10  1" x 10 1" x 10  1" x 10 1"x10       Ther Activity             LS range/LE Screen  5 quad  4 hamstring  5/5 B Ham and Quad 5/5 Ham/Quad 5/5        Knee Flexion PT OP B Nil             item Squat B Mid range stop   B B A B       Running Minimal Symptoms/B Nil  nil nil nil nil       Stairs Down B nil  nil nil Nil         Modalities

## 2022-09-15 ENCOUNTER — OFFICE VISIT (OUTPATIENT)
Dept: PHYSICAL THERAPY | Facility: CLINIC | Age: 42
End: 2022-09-15
Payer: COMMERCIAL

## 2022-09-15 DIAGNOSIS — M54.16 LUMBAR RADICULOPATHY: Primary | ICD-10-CM

## 2022-09-15 DIAGNOSIS — M25.562 ACUTE PAIN OF LEFT KNEE: ICD-10-CM

## 2022-09-15 PROCEDURE — 97110 THERAPEUTIC EXERCISES: CPT

## 2022-09-15 PROCEDURE — 97112 NEUROMUSCULAR REEDUCATION: CPT

## 2022-09-15 NOTE — PROGRESS NOTES
Daily Note     Today's date: 9/15/2022  Patient name: Marco Foss  : 1980  MRN: [id number]  Referring provider: Yamini Rodriguez PT  Dx:   Encounter Diagnosis     ICD-10-CM    1  Lumbar radiculopathy  M54 16    2  Acute pain of left knee  M25 562        Start Time: 1705  Stop Time: 1740  Total time in clinic (min): 35 minutes    Subjective: Pt reports that he sat a lot today and is a little stiff  He played golf this weekend and basketball last night  Feeling more comfortable trying more activities  Objective: See treatment diary below      Assessment: Tolerated treatment well  Mores stiffness into end range extension, improved ROM at end of session  Patient demonstrated fatigue post treatment      Plan: Continue per plan of care  Precautions: Nil      Manuals 7/1 07/08 7/18 7/25 8/29 09/07 09/15      REIL PT OP   NB          PT OP   D B D B nv? Prone PA Mobs     nv? Neuro Re-Ed             Postural Ed, + medication usage, activity progression 10 Min 5 min  5 min 8 min + Assessment  15 min + assessment, review of postural awareness 8 min  8 min       Hip ABD/ext  2x10 2 x 10                                                                           Ther Ex             REIL D B (HEP)            REIL Pt OP performed 6x10 3  x10 3  X 10 nv        REIL PT OP Performed  See MT  Review for HEP 4-8x's a day Belt OP    3x10 Belt OP    4x10       Progression ?                                        Sustained Ext   6 min 8 min 10 min 10 min 10 min      Slump Slider L  1"x10  1" x 10 1" x 10  1" x 10 1"x10 1"x10      Ther Activity             LS range/LE Screen  5 quad  4 hamstring  5/5 B Ham and Quad 5/5 Ham/Quad 5/5        Knee Flexion PT OP B Nil             item Squat B Mid range stop   B B A B       Running Minimal Symptoms/B Nil  nil nil nil nil       Stairs Down B nil  nil nil Nil         Modalities

## 2022-09-22 ENCOUNTER — OFFICE VISIT (OUTPATIENT)
Dept: PHYSICAL THERAPY | Facility: CLINIC | Age: 42
End: 2022-09-22
Payer: COMMERCIAL

## 2022-09-22 DIAGNOSIS — M25.562 ACUTE PAIN OF LEFT KNEE: ICD-10-CM

## 2022-09-22 DIAGNOSIS — M54.16 LUMBAR RADICULOPATHY: Primary | ICD-10-CM

## 2022-09-22 PROCEDURE — 97110 THERAPEUTIC EXERCISES: CPT | Performed by: PHYSICAL THERAPIST

## 2022-09-22 NOTE — PROGRESS NOTES
Daily Note     Today's date: 2022  Patient name: Roland Barcenas  : 1980  MRN: [id number]  Referring provider: Morales العلي, PT  Dx:   Encounter Diagnosis     ICD-10-CM    1  Lumbar radiculopathy  M54 16    2  Acute pain of left knee  M25 562                   Subjective: Patient stated moderate soreness in his low back and posterior left knee prior to treatment session  Objective: See treatment diary below      Assessment: Patient demonstrated decreased low back pain with sustained extension and repeated extension in lying with PT OP; demonstrated mild decrease in left knee pain with repeated end range knee extension  Plan: Continue per plan of care  Precautions: Nil      Manuals 7/1 07/08 7/18 7/25 8/29 09/07 09/15 9/22     REIL PT OP   NB          PT OP   D B D B nv? Prone PA Mobs     nv? Neuro Re-Ed             Postural Ed, + medication usage, activity progression 10 Min 5 min  5 min 8 min + Assessment  15 min + assessment, review of postural awareness 8 min  8 min       Hip ABD/ext  2x10 2 x 10                                                                           Ther Ex             REIL D B (HEP)            REIL Pt OP performed 6x10 3  x10 3  X 10 nv        REIL PT OP Performed  See MT  Review for HEP 4-8x's a day Belt OP    3x10 Belt OP    4x10  4x10     Progression ?              Repeated end range knee extension with patient OP        2x10                  Sustained Ext   6 min 8 min 10 min 10 min 10 min 15 min     Slump Slider L  1"x10  1" x 10 1" x 10  1" x 10 1"x10 1"x10      Ther Activity             LS range/LE Screen  5 quad  4 hamstring  5/5 B Ham and Quad 5/5 Ham/Quad 5/5        Knee Flexion PT OP B Nil             item Squat B Mid range stop   B B A B       Running Minimal Symptoms/B Nil  nil nil nil nil       Stairs Down B nil  nil nil Nil         Modalities

## 2022-09-29 ENCOUNTER — APPOINTMENT (OUTPATIENT)
Dept: PHYSICAL THERAPY | Facility: CLINIC | Age: 42
End: 2022-09-29
Payer: COMMERCIAL

## 2022-10-06 ENCOUNTER — OFFICE VISIT (OUTPATIENT)
Dept: PODIATRY | Facility: CLINIC | Age: 42
End: 2022-10-06

## 2022-10-06 VITALS
SYSTOLIC BLOOD PRESSURE: 126 MMHG | HEIGHT: 71 IN | DIASTOLIC BLOOD PRESSURE: 79 MMHG | BODY MASS INDEX: 33.4 KG/M2 | HEART RATE: 80 BPM | WEIGHT: 238.6 LBS

## 2022-10-06 DIAGNOSIS — L60.3 NAIL DYSTROPHY: Primary | ICD-10-CM

## 2022-10-06 PROCEDURE — NCFTCARE PR NON-COVERED FOOT CARE: Performed by: PODIATRIST

## 2022-10-06 NOTE — PROGRESS NOTES
Patient presents for palliative foot care  No acute disorder noted  Pedal pulses are palpable  Treatment consisted of nail trimming

## 2022-10-17 NOTE — PROGRESS NOTES
PT Discharge    Today's date: 10/17/2022  Patient name: Vishnu Bishop  : 1980  MRN: [id number]  Referring provider: Priscila Jones PT  Dx:   Encounter Diagnosis     ICD-10-CM    1  Lumbar radiculopathy  M54 16    2  Acute pain of left knee  M25 562        Start Time: 170  Stop Time:   Total time in clinic (min): 26 minutes    Assessment/Plan  Pt has not been present since 22  Pt's chart will be DC in compliance of facility policy as all Charts are DC within 30 days of last scheduled visit          Subjective    Objective

## 2022-10-20 ENCOUNTER — TELEPHONE (OUTPATIENT)
Dept: OBGYN CLINIC | Facility: HOSPITAL | Age: 42
End: 2022-10-20

## 2022-10-20 NOTE — TELEPHONE ENCOUNTER
Caller: patient    Doctor: Norma Huizar    Reason for call: Patient calling in stating that he finished physical therapy and they are advising him to get an MRI of his back and his knee  A lot of his stretching revolved around the back and the therapist believed his pain was coming from his back and traveling to his knee  Could an MRI be placed for this patient? Also, could it be an open air MRI? Please advise       Call back#: [phone]

## 2022-10-21 DIAGNOSIS — M25.562 CHRONIC PAIN OF LEFT KNEE: Primary | ICD-10-CM

## 2022-10-21 DIAGNOSIS — G89.29 CHRONIC PAIN OF LEFT KNEE: Primary | ICD-10-CM

## 2022-10-21 NOTE — TELEPHONE ENCOUNTER
MRI left knee ordered  I never assessed his spine/back  He can follow up before or after MRI to see if spine needs further diagnostics

## 2022-10-25 ENCOUNTER — APPOINTMENT (OUTPATIENT)
Dept: RADIOLOGY | Facility: AMBULARY SURGERY CENTER | Age: 42
End: 2022-10-25
Payer: COMMERCIAL

## 2022-10-25 ENCOUNTER — OFFICE VISIT (OUTPATIENT)
Dept: OBGYN CLINIC | Facility: CLINIC | Age: 42
End: 2022-10-25
Payer: COMMERCIAL

## 2022-10-25 VITALS
HEIGHT: 71 IN | HEART RATE: 82 BPM | BODY MASS INDEX: 33.46 KG/M2 | WEIGHT: 239 LBS | DIASTOLIC BLOOD PRESSURE: 106 MMHG | SYSTOLIC BLOOD PRESSURE: 142 MMHG

## 2022-10-25 DIAGNOSIS — G89.29 CHRONIC PAIN OF LEFT KNEE: ICD-10-CM

## 2022-10-25 DIAGNOSIS — G89.29 CHRONIC LEFT-SIDED LOW BACK PAIN WITHOUT SCIATICA: ICD-10-CM

## 2022-10-25 DIAGNOSIS — M54.50 CHRONIC LEFT-SIDED LOW BACK PAIN WITHOUT SCIATICA: ICD-10-CM

## 2022-10-25 DIAGNOSIS — M25.562 CHRONIC PAIN OF LEFT KNEE: ICD-10-CM

## 2022-10-25 DIAGNOSIS — M53.3 SACROILIAC JOINT DYSFUNCTION OF LEFT SIDE: Primary | ICD-10-CM

## 2022-10-25 PROCEDURE — 72100 X-RAY EXAM L-S SPINE 2/3 VWS: CPT

## 2022-10-25 PROCEDURE — 99214 OFFICE O/P EST MOD 30 MIN: CPT | Performed by: PHYSICAL MEDICINE & REHABILITATION

## 2022-10-25 NOTE — PROGRESS NOTES
1  Sacroiliac joint dysfunction of left side  Ambulatory referral to Pain Management   2  Chronic left-sided low back pain without sciatica  XR spine lumbar 2 or 3 views injury    MRI lumbar spine wo contrast    Ambulatory referral to Pain Management   3  Chronic pain of left knee       Orders Placed This Encounter   Procedures   • XR spine lumbar 2 or 3 views injury   • MRI lumbar spine wo contrast   • Ambulatory referral to Pain Management        Impression:  Patient is here in follow up of lumbar pain and left knee pain likely secondary to L5-S1 degenerative disc disease, left sacroiliac joint dysfunction and patellofemoral pain syndrome/meniscal derangement  Patient presents after going through physical therapy  He now continues a home exercise program   He continues to have lumbar and left knee pain  We will obtain MRI of his lumbar spine  We will have him follow up with pain management after the MRI      Imaging Studies (I personally reviewed images in PACS and report):  Left knee x-rays most recent to this encounter reviewed  These images show joint space narrowing with subchondral sclerosis release findings are consistent with Sarah Bristle grade 1 osteoarthritis  No acute osseous abnormalities  Lumbar spine x-rays obtained on 10/25/2022  These images show L5-S1 space narrowing  No acute osseous abnormalities  No follow-ups on file  Patient is in agreement with the above plan  HPI:  Darshana Solomon is a 40 y o  male  who presents in follow up  Here for   Chief Complaint   Patient presents with   • Spine - Pain       Since last visit: See above  He feels improved after PT but still has nights that he cannot sleep  Following history reviewed and updated:  Past Medical History:   Diagnosis Date   • Onychomycosis of toenail      History reviewed  No pertinent surgical history    Social History   Social History     Substance and Sexual Activity   Alcohol Use Yes    Comment: OCCASIONAL     Social History     Substance and Sexual Activity   Drug Use No     Social History     Tobacco Use   Smoking Status Never Smoker   Smokeless Tobacco Never Used     Family History   Problem Relation Age of Onset   • Pancreatic cancer Mother    • Other Father         AORTIC VALVE DISORDER   • Hypertension Family      Allergies   Allergen Reactions   • Penicillins    • Sulfa Antibiotics Other (See Comments)     UNK  UNK        Constitutional:  BP (!) 142/106   Pulse 82   Ht 5' 11" (1 803 m)   Wt 108 kg (239 lb)   BMI 33 33 kg/m²    General: NAD  Eyes: Clear sclerae  ENT: No inflammation, lesion, or mass of lips  No tracheal deviation  Musculoskeletal: As mentioned below  Integumentary: No visible rashes or skin lesions  Pulmonary/Chest: Effort normal  No respiratory distress  Neuro: CN's grossly intact, SCHNEIDER  Psych: Normal affect and judgement  Vascular: WWP  Left Knee Exam     Tenderness   The patient is experiencing tenderness in the medial joint line  Range of Motion   The patient has normal left knee ROM  Tests   Varus: negative Valgus: negative    Other   Erythema: absent  Scars: absent  Sensation: normal  Pulse: present      Back Exam     Tenderness   The patient is experiencing tenderness in the sacroiliac and lumbar  Muscle Strength   The patient has normal back strength      Other   Sensation: normal  Gait: normal   Erythema: no back redness  Scars: absent             Procedures

## 2022-11-16 ENCOUNTER — HOSPITAL ENCOUNTER (OUTPATIENT)
Dept: MRI IMAGING | Facility: HOSPITAL | Age: 42
Discharge: HOME | End: 2022-11-16
Attending: PHYSICAL MEDICINE & REHABILITATION

## 2022-11-16 DIAGNOSIS — G89.29 CHRONIC LEFT-SIDED LOW BACK PAIN WITHOUT SCIATICA: ICD-10-CM

## 2022-11-16 DIAGNOSIS — M25.562 CHRONIC PAIN OF LEFT KNEE: ICD-10-CM

## 2022-11-16 DIAGNOSIS — G89.29 CHRONIC PAIN OF LEFT KNEE: ICD-10-CM

## 2022-11-16 DIAGNOSIS — M54.50 CHRONIC LEFT-SIDED LOW BACK PAIN WITHOUT SCIATICA: ICD-10-CM

## 2022-11-21 ENCOUNTER — TELEPHONE (OUTPATIENT)
Dept: OBGYN CLINIC | Facility: CLINIC | Age: 42
End: 2022-11-21

## 2022-11-21 DIAGNOSIS — G89.29 CHRONIC LEFT-SIDED LOW BACK PAIN WITHOUT SCIATICA: Primary | ICD-10-CM

## 2022-11-21 DIAGNOSIS — M54.50 CHRONIC LEFT-SIDED LOW BACK PAIN WITHOUT SCIATICA: Primary | ICD-10-CM

## 2022-11-21 NOTE — TELEPHONE ENCOUNTER
Caller: Patient     Doctor: Niels Curiel     Reason for call: Patient looking for a call back to go over results of MRI done on 11/16/22     Call back#: [phone]

## 2022-12-02 DIAGNOSIS — R05.1 ACUTE COUGH: Primary | ICD-10-CM

## 2022-12-02 DIAGNOSIS — J02.9 PHARYNGITIS, UNSPECIFIED ETIOLOGY: ICD-10-CM

## 2022-12-02 RX ORDER — AZITHROMYCIN 250 MG/1
TABLET, FILM COATED ORAL
Qty: 6 TABLET | Refills: 0 | Status: SHIPPED | OUTPATIENT
Start: 2022-12-02 | End: 2023-06-07 | Stop reason: SDUPTHER

## 2022-12-02 RX ORDER — BENZONATATE 100 MG/1
100 CAPSULE ORAL 3 TIMES DAILY PRN
Qty: 30 CAPSULE | Refills: 1 | Status: SHIPPED | OUTPATIENT
Start: 2022-12-02

## 2022-12-05 ENCOUNTER — OFFICE VISIT (OUTPATIENT)
Dept: PODIATRY | Facility: CLINIC | Age: 42
End: 2022-12-05

## 2022-12-05 VITALS
BODY MASS INDEX: 33.6 KG/M2 | SYSTOLIC BLOOD PRESSURE: 133 MMHG | HEIGHT: 71 IN | WEIGHT: 240 LBS | DIASTOLIC BLOOD PRESSURE: 80 MMHG | HEART RATE: 75 BPM

## 2022-12-05 DIAGNOSIS — L60.3 NAIL DYSTROPHY: Primary | ICD-10-CM

## 2022-12-05 NOTE — PROGRESS NOTES
Portions of the record may have been created with voice recognition software  Occasional wrong word or "sound a like" substitutions may have occurred due to the inherent limitations of voice recognition software  Read the chart carefully and recognize, using context, where substitutions have occurred  Assessment  1  Chronic left-sided low back pain with left-sided sciatica        Plan  No orders of the defined types were placed in this encounter  New Medications Ordered This Visit   Medications   • methylPREDNISolone 4 MG tablet therapy pack     Sig: Use as directed on package     Dispense:  21 tablet     Refill:  [de-identified]      40year-old gentleman presenting worsening of his chronic left-sided low back and leg pain for over 10 years  On physical exam no motor or sensory deficits noted negative straight leg test bilaterally, negative facet loading  Lumbar MRI imaging showing L5-S1 paracentral left foraminal disc extrusion with moderate left foraminal narrowing  Etiology of patient's pain presentation could be secondary to lumbar radiculitis  - I explained the possible etiology of his pain presentation as well as treatment options including conservative management with therapy and exercises and as needed pain medication as well as interventional options including epidural steroid injection  I explained the risks and benefits of the procedure and patient wishes to proceed with epidural injection at this time     -Plan for L5-S1 JUSTINE  -Medrol Dosepak in setting of patient pain symptoms and to provide  relief until injection  Patient advised not to take any NSAIDs while taking the oral steroids and to monitor for side effects  My impressions and treatment recommendations were discussed in detail with the patient who verbalized understanding and had no further questions  Discharge instructions were provided   I personally saw and examined the patient and I agree with the above discussed plan of care       History of Present Illness    Marco Foss is a 40 y o  male  referred to [facility] by Bright Luis DO for chronic left-sided low back and leg pain  Patient reports more than 10-year history of left-sided low back and leg pain that started without any known injury or accident  Over the past month the pain has been moderate to severe 9 out of 10 and sometimes interferes with his daily activities  The pain is nearly constant 6 to 95% of the time and occurs worse in the evening  Describes it as shooting, sharp, pins-and-needles, pressure-like  Does not have any weakness of bilateral lower extremity or any associated sensory deficits  Does not use any assist device for ambulation  The pain is localized to the left lumbosacral region radiating along the posterior left thigh and lower leg terminating around the ankle region  Denies any radiating pain into the foot or toes  The pain is worsened after prolonged day at work and with prolonged standing or sitting  It is not changed by bending  Not changed by exercise coughing or sneezing  In terms of management, patient has had multiple sessions of physical therapy for his low back as well as left knee pain in the past with some relief  He reports moderate relief to heat ice  In terms of medications, he reports taking occasional Tylenol and ibuprofen which provide some relief  He denies ever taking any other medications or ever having any spinal injections or surgeries  In terms of imaging, patient underwent lumbar MRI on 11- which showed L4-L5 right foraminal disc herniation with mild neuroforaminal narrowing but patent in the left neuroforaminal   There is a left paracentral foraminal disc protrusion at L5-S1 with moderate left neuroforaminal narrowing  Central canal is mildly narrowed  There are spondylitic lytic changes throughout the lumbar spine      Patient denies any bowel bladder incontinence or any saddle anesthesia  Denies any recent fevers chills or weight changes  Lab Results   Component Value Date    CREATININE 1 18 11/20/2019     Lab Results   Component Value Date    ALT 51 11/20/2019    ALT 49 12/07/2015         Imaging:    MRI LUMBAR SPINE WITHOUT CONTRAST      11-  INDICATION: M54 50: Low back pain, unspecified  G89 29: Other chronic pain  Low back pain     COMPARISON:  8/12/2016     TECHNIQUE:  Multiplanar, multisequence imaging of the lumbar spine was performed             FINDINGS:     VERTEBRAL BODIES:  There are 5 lumbar type vertebral bodies  Type II Modic endplate changes inferior endplate of L5 noted  Mild spondylotic changes elsewhere  No compression deformity or focally suspicious marrow lesion      SACRUM:       Scattered degenerative endplate changes  No focally suspicious marrow lesions  No bone marrow edema or compression abnormality      DISTAL CORD AND CONUS:  Normal size and signal within the distal cord and conus  The conus medullaris terminates at the L1 level      PARASPINAL SOFT TISSUES:  Paraspinal soft tissues are unremarkable      LOWER THORACIC DISC SPACES:  Normal disc height and signal   No disc herniation, canal stenosis or foraminal narrowing      LUMBAR DISC SPACES:     L1-L2:  Normal      L2-L3:  Normal      L3-L4:  There is bilateral facet hypertrophy  There is a small left neural foraminal disc protrusion  Mild left neural foraminal narrowing  Central canal right neural foramen patent  This level is similar to the prior study      L4-L5:  There is a right neural foraminal disc herniation, protrusion type  This is new from the prior study  Mild right neural foraminal narrowing  Central canal patent  Left neural foramen patent      L5-S1:  There is a left paracentral/foraminal disc protrusion  Moderate left neural foraminal narrowing  There is bilateral facet hypertrophy    Mild to moderate left neural foraminal narrowing  Central canal mildly narrowed  Moderate right neural   foraminal narrowing  This level is similar to the prior study      IMPRESSION:     Lumbar spondylosis slightly increased at L4-5  Spondylotic changes elsewhere are stable    MRI LEFT KNEE   11-     INDICATION:   M25 562: Pain in left knee  G89 29: Other chronic pain      COMPARISON: Left knee radiograph 6/8/2022     TECHNIQUE:    Multiplanar/multisequence MR of the left knee was performed          FINDINGS:     SUBCUTANEOUS TISSUES: Normal     JOINT EFFUSION: There is a small joint effusion      BAKER'S CYST: There is a moderate Baker's cyst      MENISCI: Intact      CRUCIATE LIGAMENTS: Intact      EXTENSOR APPARATUS: Intact      COLLATERAL LIGAMENTS: Intact      ARTICULAR SURFACES: Mild cartilage thinning at the lateral tibial plateau (series 8 image 13)  No focal area of full-thickness cartilage loss      BONES: Normal      MUSCULATURE:  Intact      IMPRESSION:  1  No ligamentous or meniscal injury  2   Mild degenerative changes  3   Small joint effusion and moderate popliteal cyst       No MRI cervical spine results found in the past 2 years   MRI lumbar spine wo contrast    Result Date: 11/19/2022  Impression     Lumbar spondylosis slightly increased at L4-5  Spondylotic changes elsewhere are stable  LUMBAR SPINE  10-     INDICATION:   M54 50: Low back pain, unspecified  G89 29: Other chronic pain      COMPARISON:  None     VIEWS:  XR SPINE LUMBAR 2 OR 3 VIEWS INJURY  Images: 2     FINDINGS:     There are 5 non rib bearing lumbar vertebral bodies       There is no evidence of acute fracture or destructive osseous lesion      Alignment is unremarkable       Disc space narrowing seen at L5-S1    Degenerative disc disease seen at T11/12     The pedicles appear intact      Soft tissues are unremarkable      IMPRESSION:  No acute compression collapse of the vertebra  Degenerative disc disease at L5-S1, T11-12    LEFT KNEE 6-     INDICATION:   M25 562: Pain in left knee      COMPARISON:  None     VIEWS:  XR KNEE 3 VW LEFT NON INJURY         FINDINGS:     There is no acute fracture or dislocation      There is no joint effusion      No significant degenerative changes      No lytic or blastic osseous lesion      Soft tissues are unremarkable      IMPRESSION:     No acute osseous abnormality      LEG LENGTH STUDY   8-     INDICATION: Unequal leg length  Stabbing pain in the left thigh when laying down  Evaluate leg lengths       VIEWS:  A noncontrast supine  CT scanogram was performed      COMPARISON:  None      FINDINGS:         Bone length measurements are as follows:     Right femur from femoral head to medial femoral condyle = 49 5 cm      Left femur from femoral head to medial femoral condyle = 49 3 cm     Right tibia from medial tibial plateau the tibial plafond = 38 9 cm      Left tibia from medial tibial plateau the tibial plafond = 39 1 cm      Total right leg length including knee joint space from femoral head to tibial plafond = 89 1 cm      Total left leg length including knee joint space from femoral head to tibial plafond = 88 6 cm      IMPRESSION:     1  Leg length discrepancy measurements as above    MRI LUMBAR SPINE WITHOUT CONTRAST  8-     INDICATION:  Low back pain with left leg radiculopathy      COMPARISON:  None      TECHNIQUE:  Sagittal T1, sagittal T2, sagittal inversion recovery, axial T1 and axial T2, coronal T2        IMAGE QUALITY:  Diagnostic     FINDINGS:     OSSEOUS STRUCTURES:  Normal alignment of the lumbar spine  No spondylolysis or spondylolisthesis  No scoliosis  There is no compression fracture  Type II endplate marrow degenerative change involving the L5-S1 endplates      SACRUM:  Normal signal within the sacrum  No evidence of insufficiency or stress fracture      DISTAL CORD AND CONUS:  Normal size and signal within the distal cord and conus    The conus ends at the L1 level      PARASPINAL SOFT TISSUES:  Paraspinal soft tissues are unremarkable      LOWER THORACIC DISC SPACES:  Normal disc height and signal   No disc herniation, canal stenosis or foraminal narrowing      LUMBAR DISC SPACES:          L1-L2:  Normal      L2-L3:  Normal      L3-L4:  Normal disc height and signal   Mild foraminal narrowing which may be related to congenital short pedicles  No discrete foraminal nerve impingement      L4-L5:  Normal disc height and signal   No disc herniation, canal stenosis or foraminal narrowing      L5-S1:  Disc desiccation and loss of disc height  Mild diffuse annular bulging without focal disc herniation  No canal stenosis      IMPRESSION:     No disc herniation or canal stenosis  Mild foraminal narrowing without discrete nerve impingement at the L3-4 level      Workstation performed: [id number]          No MRI thoracic spine results found in the past 2 years   No X-ray cervical spine results found in the past 2 years   XR spine lumbar 2 or 3 views injury    Result Date: 10/29/2022  Impression No acute compression collapse of the vertebra Degenerative disc disease at L5-S1, T11-12                 Workstation performed: [id number]          No X-ray hip/pelvis results found in the past 2 years   XR knee 3 vw left non injury    Result Date: 6/12/2022  Impression     No acute osseous abnormality  Workstation performed: [id number]                I have personally reviewed and/or updated the patient's past medical history, past surgical history, family history, social history, current medications, allergies, and vital signs today  Review of Systems   Musculoskeletal: Positive for back pain, gait problem and joint swelling  Left leg, left knee    Neurological: Positive for weakness and numbness  Patient Active Problem List   Diagnosis   • Hyperlipidemia   • Obesity (BMI 30-39  9)   • Chronic left-sided low back pain without sciatica   • Fungal skin infection   • Eustachian tube disorder, bilateral   • Health care maintenance   • Allergy   • Chronic pain of left knee       Past Medical History:   Diagnosis Date   • Onychomycosis of toenail        History reviewed  No pertinent surgical history  Family History   Problem Relation Age of Onset   • Pancreatic cancer Mother    • Other Father         AORTIC VALVE DISORDER   • Hypertension Family        Social History     Occupational History   • Not on file   Tobacco Use   • Smoking status: Never   • Smokeless tobacco: Never   Vaping Use   • Vaping Use: Never used   Substance and Sexual Activity   • Alcohol use: Yes     Comment: OCCASIONAL   • Drug use: No   • Sexual activity: Not Currently       Current Outpatient Medications on File Prior to Visit   Medication Sig   • benzonatate (TESSALON PERLES) 100 mg capsule Take 1 capsule (100 mg total) by mouth 3 (three) times a day as needed for cough   • betamethasone, augmented, (DIPROLENE-AF) 0 05 % cream    • clotrimazole-betamethasone (LOTRISONE) 1-0 05 % lotion Apply topically 2 (two) times a day   • cyanocobalamin (VITAMIN B-12) 100 mcg tablet Take by mouth daily   • cyclobenzaprine (FLEXERIL) 5 mg tablet Take 1 tablet (5 mg total) by mouth daily at bedtime   • cyclobenzaprine (FLEXERIL) 5 mg tablet Take 1 tablet (5 mg total) by mouth daily at bedtime   • gabapentin (NEURONTIN) 100 mg capsule Take 1 capsule (100 mg total) by mouth daily at bedtime   • mometasone (NASONEX) 50 mcg/act nasal spray 1 spray into each nostril 2 (two) times a day   • multivitamin (THERAGRAN) TABS Take 1 tablet by mouth daily   • naproxen (NAPROSYN) 500 mg tablet Take 1 tablet (500 mg total) by mouth 2 (two) times a day with meals   • nystatin (MYCOSTATIN) powder Apply topically 2 (two) times a day   • Specialty Vitamins Products (magnesium, amino acid chelate,) 133 MG tablet Take 1 tablet by mouth 2 (two) times a day     No current facility-administered medications on file prior to visit  Allergies   Allergen Reactions   • Penicillins    • Sulfa Antibiotics Other (See Comments)     Aries Fuentes       Physical Exam    /100   Pulse 92   Ht 5' 11" (1 803 m) Comment: verbal  Wt 109 kg (240 lb)   BMI 33 47 kg/m²     Constitutional: normal, well developed, well nourished, alert, in no distress and non-toxic and no overt pain behavior  Eyes: anicteric  HEENT: grossly intact  Neck: supple, symmetric, trachea midline and no masses   Pulmonary:even and unlabored  Cardiovascular:No edema or pitting edema present  Skin:Normal without rashes or lesions and well hydrated  Psychiatric:Mood and affect appropriate  Neurologic:Cranial Nerves II-XII grossly intact  Musculoskeletal:normal      Palpation:    Mild tenderness over the left paravertebral musculature  No tenderness over the right paravertebral musculature    Mild 40year-old gentleman presenting with tenderness with palpation of the left SI joint  No tenderness with palpation of the right SI joint    No tenderness with palpation of the left greater trochanter  No tenderness with palpation of the right greater trochanter    Sensory:    Intact to light touch grossly in the left lower extremity  Intact to light touch grossly in the right lower extremity    Muscle Strength      Hip flexion Knee flexion Knee extension Foot plantarflexion Foot   dorsiflexion EHL   L 5/5 5/5 5/5 5/5 5/5 5/5   R 5/5 5/5 5/5 5/5 5/5 5/5     DTRs: 2+ patellar, 2+ Achilles and symmetric       Special Tests:     Facet loading Straight leg raise RELL FAIR   L  negative  negative  negative    R  negative  negative  negative

## 2022-12-09 ENCOUNTER — CONSULT (OUTPATIENT)
Dept: PAIN MEDICINE | Facility: CLINIC | Age: 42
End: 2022-12-09

## 2022-12-09 VITALS
HEART RATE: 92 BPM | BODY MASS INDEX: 33.6 KG/M2 | HEIGHT: 71 IN | WEIGHT: 240 LBS | SYSTOLIC BLOOD PRESSURE: 147 MMHG | DIASTOLIC BLOOD PRESSURE: 100 MMHG

## 2022-12-09 DIAGNOSIS — G89.29 CHRONIC LEFT-SIDED LOW BACK PAIN WITH LEFT-SIDED SCIATICA: ICD-10-CM

## 2022-12-09 DIAGNOSIS — M54.42 CHRONIC LEFT-SIDED LOW BACK PAIN WITH LEFT-SIDED SCIATICA: ICD-10-CM

## 2022-12-09 RX ORDER — METHYLPREDNISOLONE 4 MG/1
TABLET ORAL
Qty: 21 TABLET | Refills: 0 | Status: SHIPPED | OUTPATIENT
Start: 2022-12-09

## 2023-01-05 ENCOUNTER — OFFICE VISIT (OUTPATIENT)
Dept: INTERNAL MEDICINE CLINIC | Facility: CLINIC | Age: 43
End: 2023-01-05

## 2023-01-05 VITALS
HEIGHT: 71 IN | TEMPERATURE: 97.3 F | WEIGHT: 240.6 LBS | OXYGEN SATURATION: 98 % | BODY MASS INDEX: 33.68 KG/M2 | HEART RATE: 97 BPM

## 2023-01-05 DIAGNOSIS — H69.82 DYSFUNCTION OF LEFT EUSTACHIAN TUBE: ICD-10-CM

## 2023-01-05 DIAGNOSIS — H69.93 EUSTACHIAN TUBE DISORDER, BILATERAL: Primary | ICD-10-CM

## 2023-01-05 RX ORDER — FLUTICASONE PROPIONATE 50 MCG
SPRAY, SUSPENSION (ML) NASAL
Qty: 16 G | Refills: 1 | Status: SHIPPED | OUTPATIENT
Start: 2023-01-05

## 2023-01-05 NOTE — PROGRESS NOTES
Name: Isaac Alvarez      : 1980      MRN: [id number]  Encounter Provider: Jeff Hicks MD  Encounter Date: 2023   Encounter department: [address]     1  Dysfunction of left eustachian tube  -     fluticasone (FLONASE) 50 mcg/act nasal spray; 1 puff each nostril twice a day    2  Eustachian tube disorder, bilateral  Assessment & Plan:  Bilateral tympanic membrane dysfunction with more significant symptoms on the left side  Physical examination today reveals no evidence of infection process  The patient recently was on steroid medication for his back and did not see any significant improvement in his symptoms of the tinnitus  I have recommended that we try a combination of Afrin 1 puff in each nostril twice a day for 3 days and fluticasone nasal spray 1 puff twice a day in each nostril for period of 10 days I will see him for a follow-up assessment at that time  If he continues to have symptoms I suspect most likely will need to send him onto ENT services for further evaluation of his eustachian tube dysfunction  BMI Counseling: Body mass index is 33 56 kg/m²  Follow-up plan was not completed due to patient being in urgent or emergent medical situation  Subjective      This 61-year-old gentleman presents today for evaluation of humming or buzzing sound in his left ear in particular  He denies any vertigo symptoms or headache  Has had no recent sinus infection symptoms  Has had issues with eustachian tube dysfunction in the past and on 2 occasions had ear tubes inserted  Review of Systems   HENT: Positive for tinnitus  Pressure in the ears   All other systems reviewed and are negative        Current Outpatient Medications on File Prior to Visit   Medication Sig   • benzonatate (TESSALON PERLES) 100 mg capsule Take 1 capsule (100 mg total) by mouth 3 (three) times a day as needed for cough   • betamethasone, augmented, (DIPROLENE-AF) 0 05 % cream    • clotrimazole-betamethasone (LOTRISONE) 1-0 05 % lotion Apply topically 2 (two) times a day   • cyanocobalamin (VITAMIN B-12) 100 mcg tablet Take by mouth daily   • cyclobenzaprine (FLEXERIL) 5 mg tablet Take 1 tablet (5 mg total) by mouth daily at bedtime   • cyclobenzaprine (FLEXERIL) 5 mg tablet Take 1 tablet (5 mg total) by mouth daily at bedtime   • gabapentin (NEURONTIN) 100 mg capsule Take 1 capsule (100 mg total) by mouth daily at bedtime   • methylPREDNISolone 4 MG tablet therapy pack Use as directed on package   • multivitamin (THERAGRAN) TABS Take 1 tablet by mouth daily   • naproxen (NAPROSYN) 500 mg tablet Take 1 tablet (500 mg total) by mouth 2 (two) times a day with meals   • nystatin (MYCOSTATIN) powder Apply topically 2 (two) times a day   • Specialty Vitamins Products (magnesium, amino acid chelate,) 133 MG tablet Take 1 tablet by mouth 2 (two) times a day   • [DISCONTINUED] mometasone (NASONEX) 50 mcg/act nasal spray 1 spray into each nostril 2 (two) times a day       Objective     Pulse 97   Temp (!) 97 3 °F (36 3 °C)   Ht 5' 11" (1 803 m)   Wt 109 kg (240 lb 9 6 oz)   SpO2 98%   BMI 33 56 kg/m²     Physical Exam  Constitutional:       General: He is not in acute distress  Appearance: He is well-developed  HENT:      Head: Normocephalic  Right Ear: Hearing, ear canal and external ear normal       Left Ear: Hearing, ear canal and external ear normal       Ears:      Comments: There is evidence of bilateral tympanic membrane bulge more significant on the left side  No indication of tympanic membrane inflammation nor any fluid visible behind the tympanic membranes bilaterally  Nose: Congestion present  Mouth/Throat:      Mouth: Mucous membranes are moist       Pharynx: Oropharynx is clear  Eyes:      Conjunctiva/sclera: Conjunctivae normal       Pupils: Pupils are equal, round, and reactive to light     Neck:      Thyroid: No thyromegaly  Vascular: No carotid bruit  Cardiovascular:      Rate and Rhythm: Normal rate and regular rhythm  Heart sounds: Normal heart sounds, S1 normal and S2 normal  No murmur heard  Pulmonary:      Effort: Pulmonary effort is normal       Breath sounds: Normal breath sounds  No wheezing, rhonchi or rales  Abdominal:      General: Bowel sounds are normal       Palpations: Abdomen is soft  Musculoskeletal:         General: Normal range of motion  Cervical back: Normal range of motion and neck supple  No rigidity or tenderness  Lymphadenopathy:      Cervical: No cervical adenopathy  Skin:     General: Skin is warm and dry  Neurological:      Mental Status: He is alert and oriented to person, place, and time  Deep Tendon Reflexes: Reflexes are normal and symmetric  Psychiatric:         Behavior: Behavior normal          Thought Content:  Thought content normal          Judgment: Judgment normal        Michael Veloz MD

## 2023-01-05 NOTE — ASSESSMENT & PLAN NOTE
Bilateral tympanic membrane dysfunction with more significant symptoms on the left side  Physical examination today reveals no evidence of infection process  The patient recently was on steroid medication for his back and did not see any significant improvement in his symptoms of the tinnitus  I have recommended that we try a combination of Afrin 1 puff in each nostril twice a day for 3 days and fluticasone nasal spray 1 puff twice a day in each nostril for period of 10 days I will see him for a follow-up assessment at that time  If he continues to have symptoms I suspect most likely will need to send him onto ENT services for further evaluation of his eustachian tube dysfunction

## 2023-01-10 ENCOUNTER — HOSPITAL ENCOUNTER (OUTPATIENT)
Facility: HOSPITAL | Age: 43
Setting detail: OUTPATIENT SURGERY
Discharge: HOME | End: 2023-01-10
Attending: STUDENT IN AN ORGANIZED HEALTH CARE EDUCATION/TRAINING PROGRAM | Admitting: STUDENT IN AN ORGANIZED HEALTH CARE EDUCATION/TRAINING PROGRAM

## 2023-01-10 ENCOUNTER — HOSPITAL ENCOUNTER (OUTPATIENT)
Dept: RADIOLOGY | Facility: HOSPITAL | Age: 43
Setting detail: OUTPATIENT SURGERY
End: 2023-01-10

## 2023-01-10 VITALS
DIASTOLIC BLOOD PRESSURE: 65 MMHG | TEMPERATURE: 97.8 F | RESPIRATION RATE: 18 BRPM | HEART RATE: 88 BPM | SYSTOLIC BLOOD PRESSURE: 139 MMHG | OXYGEN SATURATION: 96 %

## 2023-01-10 DIAGNOSIS — M54.42 ACUTE BACK PAIN WITH SCIATICA, LEFT: ICD-10-CM

## 2023-01-10 RX ORDER — METHYLPREDNISOLONE ACETATE 80 MG/ML
INJECTION, SUSPENSION INTRA-ARTICULAR; INTRALESIONAL; INTRAMUSCULAR; SOFT TISSUE AS NEEDED
Status: DISCONTINUED | OUTPATIENT
Start: 2023-01-10 | End: 2023-01-10 | Stop reason: HOSPADM

## 2023-01-10 RX ORDER — LIDOCAINE HYDROCHLORIDE 10 MG/ML
INJECTION, SOLUTION EPIDURAL; INFILTRATION; INTRACAUDAL; PERINEURAL AS NEEDED
Status: DISCONTINUED | OUTPATIENT
Start: 2023-01-10 | End: 2023-01-10 | Stop reason: HOSPADM

## 2023-01-10 RX ORDER — SODIUM CHLORIDE 9 MG/ML
INJECTION INTRAVENOUS AS NEEDED
Status: DISCONTINUED | OUTPATIENT
Start: 2023-01-10 | End: 2023-01-10 | Stop reason: HOSPADM

## 2023-01-10 NOTE — OP NOTE
OPERATIVE REPORT  PATIENT NAME: Alexandra Diamond    :  1980  MRN: [id number]  Pt Location: [facility] OR ROOM 01    SURGERY DATE: 1/10/2023    Surgeon(s) and Role:     * Bert Christopher DO - Primary    Preop Diagnosis:  Acute back pain with sciatica, left [M54 42]    Post-Op Diagnosis Codes:     * Acute back pain with sciatica, left [M54 42]    Procedure(s) (LRB):  L5-S1 LUMBAR EPIDURAL STEROID INJECTION (23174) (N/A)    Specimen(s):  * No specimens in log *    Estimated Blood Loss:   Minimal    Drains:  * No LDAs found *    Anesthesia Type:   Local    Operative Indications:  Acute back pain with sciatica, left [M54 42]      Operative Findings:      Complications:   None    Procedure and Technique:  DATE: January 10, 2023  PROCEDURE: Lumbar Epidural Steroid Injection under Fluoroscopy at L5-S1  PHYSICIAN: Sher Sepulveda DO  PRE-OPERATIVE DIAGNOSIS: Lumbar radiculopathy, radiculitis,   POST-OPERATIVE DIAGNOSIS: Same  ANESTHESIA: local  COMPLICATIONS: none    Indications  Alexandra Diamond is a 40 y o  male with a history of low back and leg pain who has failed conservative therapy and presents today seeking a more definitive interventional treatment  Informed Consent  The risks, benefits and alternatives of the procedure were discussed with the patient  The patient was given opportunity to ask questions regarding the procedure, its indications and the associated risks  The risks of the procedure discussed include but are not limited to infection, bleeding, headache, worsened pain, allergic reactions, nerve injuries, paralysis, susceptibility to COVID from steroids, and side effects  The patient showed understanding and wished to proceed  Informed consent was signed  Preparation  After obtaining informed consent, the patient's chart was reviewed, and the patient's identification was confirmed  The patient was brought to the Operating Room and placed in the prone position on the operating room table   Routine monitors were applied  A surgical timeout was performed per hospital policy  No skin lesions or signs of cellulitis were noted  Strict sterile technique was used  Skin was prepped with chloraprep solution and draped in sterile manner  Using an entry point in the space identified via fluoroscopy, the overlying skin was anesthetized with 1% lidocaine using a 25 gauge needle  Using a paramedian approach, a 20  gauge touhy needle was inserted through the anesthetized skin, and was advanced under fluoroscopic guidance  The needle was redirected until the ligamentum flavum was engaged at the L5-S1 interspace and the epidural space was entered by loss of resistance to air  There were no noted parasthesias, and aspiration was negative for heme and CSF  Position of the needle in the epidural space was confirmed by fluoroscopy using AP, contralateral  oblique and lateral views  1 cc  Omnipaque 300 contrast dye was then injected, and an appropriate epidurogram was observed with no vascular uptake  3 cc preservative free normal saline and 80 mg depo, was injected intermittently after negative heme and csf aspirations  There was no pain on injection  A Band-Aid dressing was applied  The patient tolerated the procedure well  Vital signs remained stable throughout  The post-operative exam did not reveal any new neurological deficits  Patient was sent to the recovery room in a stable condition  The patient was informed when to follow up post procedure  The patient was instructed to call with fever, chills, increased pain, redness or swelling at the injection site, or numbness or weakness in the leg       I was present for the entire procedure    Patient Disposition:  PACU         SIGNATURE: Fidel Hutchins DO  DATE: January 10, 2023  TIME: 12:04 PM

## 2023-01-10 NOTE — H&P
History of Present Illness: The patient is a 40 y o  male who presents with complaints of left-sided low back and leg pain presenting for LESI  Past Medical History:   Diagnosis Date   • Onychomycosis of toenail        No past surgical history on file  No current facility-administered medications for this encounter      Current Outpatient Medications:   •  benzonatate (TESSALON PERLES) 100 mg capsule, Take 1 capsule (100 mg total) by mouth 3 (three) times a day as needed for cough, Disp: 30 capsule, Rfl: 1  •  betamethasone, augmented, (DIPROLENE-AF) 0 05 % cream, , Disp: , Rfl:   •  clotrimazole-betamethasone (LOTRISONE) 1-0 05 % lotion, Apply topically 2 (two) times a day, Disp: 30 mL, Rfl: 0  •  cyanocobalamin (VITAMIN B-12) 100 mcg tablet, Take by mouth daily, Disp: , Rfl:   •  cyclobenzaprine (FLEXERIL) 5 mg tablet, Take 1 tablet (5 mg total) by mouth daily at bedtime, Disp: 10 tablet, Rfl: 0  •  cyclobenzaprine (FLEXERIL) 5 mg tablet, Take 1 tablet (5 mg total) by mouth daily at bedtime, Disp: 10 tablet, Rfl: 0  •  fluticasone (FLONASE) 50 mcg/act nasal spray, 1 puff each nostril twice a day, Disp: 16 g, Rfl: 1  •  gabapentin (NEURONTIN) 100 mg capsule, Take 1 capsule (100 mg total) by mouth daily at bedtime, Disp: 30 capsule, Rfl: 1  •  methylPREDNISolone 4 MG tablet therapy pack, Use as directed on package, Disp: 21 tablet, Rfl: 0  •  multivitamin (THERAGRAN) TABS, Take 1 tablet by mouth daily, Disp: , Rfl:   •  naproxen (NAPROSYN) 500 mg tablet, Take 1 tablet (500 mg total) by mouth 2 (two) times a day with meals, Disp: 14 tablet, Rfl: 0  •  nystatin (MYCOSTATIN) powder, Apply topically 2 (two) times a day, Disp: 30 g, Rfl: 0  •  Specialty Vitamins Products (magnesium, amino acid chelate,) 133 MG tablet, Take 1 tablet by mouth 2 (two) times a day, Disp: , Rfl:     Allergies   Allergen Reactions   • Penicillins    • Sulfa Antibiotics Other (See Comments)     Antonia Bijou       Physical Exam:   Vitals: 01/10/23 1048   BP: 139/65   Pulse: 88   Resp: 18   Temp:    SpO2: 96%     General: Awake, Alert, Oriented x 3, Mood and affect appropriate  Respiratory: Respirations even and unlabored  Cardiovascular: Peripheral pulses intact; no edema  Musculoskeletal Exam: Tenderness to palpation left paraspinal region but no motor or sensory deficits grossly  ASA Score: 2    Patient/Chart Verification  Patient ID Verified: Verbal  ID Band Applied: Yes  Consents Confirmed: Procedural  H&P( within 30 days) Verified: Yes  Interval H&P(within 24 hr) Complete (required for Outpatients and Surgery Admit only): Yes  Beta Blocker given : N/A  Pre-op Lab/Test Results Available: N/A  Does Patient Have a Prosthetic Device/Implant: Unable to Assess    Assessment: No diagnosis found      Plan:   LESI L5-S1

## 2023-01-10 NOTE — DISCHARGE INSTRUCTIONS
Epidural Steroid Injection   WHAT YOU NEED TO KNOW:   An epidural steroid injection (JUSTINE) is a procedure to inject steroid medicine into the epidural space  The epidural space is between your spinal cord and vertebrae  Steroids reduce inflammation and fluid buildup in your spine that may be causing pain  You may be given pain medicine along with the steroids  ACTIVITY  Do not drive or operate machinery today  No strenuous activity today - bending, lifting, etc   You may resume normal activites starting tomorrow - start slowly and as tolerated  You may shower today, but no tub baths or hot tubs  You may have numbness for several hours from the local anesthetic  Please use caution and common sense, especially with weight-bearing activities  CARE OF THE INJECTION SITE  If you have soreness or pain, apply ice to the area today (20 minutes on/20 minutes off)  Starting tomorrow, you may use warm, moist heat or ice if needed  You may have an increase or change in your discomfort for 36-48 hours after your treatment  Apply ice and continue with any pain medication you have been prescribed  Notify the [facility] if you have any of the following: redness, drainage, swelling, headache, stiff neck or fever above 100°F     SPECIAL INSTRUCTIONS  Our office will contact you in approximately 7 days for a progress report  MEDICATIONS  Continue to take all routine medications  Our office may have instructed you to hold some medications  As no general anesthesia was used in today's procedure, you should not experience any side effects related to anesthesia  If you are diabetic, the steroids used in today's injection may temporarily increase your blood sugar levels after the first few days after your injection  Please keep a close eye on your sugars and alert the doctor who manages your diabetes if your sugars are significantly high from your baseline or you are symptomatic       If you have a problem specifically related to your procedure, please call our office at [phone]  Problems not related to your procedure should be directed to your primary care physician

## 2023-01-17 ENCOUNTER — OFFICE VISIT (OUTPATIENT)
Dept: PODIATRY | Facility: CLINIC | Age: 43
End: 2023-01-17

## 2023-01-17 ENCOUNTER — TELEPHONE (OUTPATIENT)
Dept: RADIOLOGY | Facility: MEDICAL CENTER | Age: 43
End: 2023-01-17

## 2023-01-17 VITALS
DIASTOLIC BLOOD PRESSURE: 94 MMHG | HEIGHT: 71 IN | SYSTOLIC BLOOD PRESSURE: 144 MMHG | WEIGHT: 235 LBS | BODY MASS INDEX: 32.9 KG/M2 | HEART RATE: 76 BPM

## 2023-01-17 DIAGNOSIS — L60.3 NAIL DYSTROPHY: Primary | ICD-10-CM

## 2023-01-17 NOTE — PROGRESS NOTES
Patient presents for palliative foot care  No acute disorder noted  Pedal pulses are palpable  Treatment consists of nail trimming

## 2023-01-25 ENCOUNTER — OFFICE VISIT (OUTPATIENT)
Dept: INTERNAL MEDICINE CLINIC | Facility: CLINIC | Age: 43
End: 2023-01-25

## 2023-01-25 VITALS
TEMPERATURE: 96.9 F | HEART RATE: 88 BPM | OXYGEN SATURATION: 98 % | WEIGHT: 236 LBS | HEIGHT: 71 IN | BODY MASS INDEX: 33.04 KG/M2

## 2023-01-25 DIAGNOSIS — H69.82 DYSFUNCTION OF LEFT EUSTACHIAN TUBE: ICD-10-CM

## 2023-01-25 DIAGNOSIS — H69.93 EUSTACHIAN TUBE DISORDER, BILATERAL: Primary | ICD-10-CM

## 2023-01-25 RX ORDER — FLUTICASONE PROPIONATE 50 MCG
SPRAY, SUSPENSION (ML) NASAL
Qty: 16 G | Refills: 1 | Status: SHIPPED | OUTPATIENT
Start: 2023-01-25

## 2023-01-25 NOTE — ASSESSMENT & PLAN NOTE
On examination today the patient's previous tympanic membrane bulge has resolved  The tympanic membranes on both sides appear to be normal in appearance  Nasal examination reveals no significant nasal membrane swelling  I recommend the patient can continue on the fluticasone medicine 1 puff in each nostril twice a day for the next several months until June  If he has relapse of symptoms he can add 3 days of Afrin spray  If he develops any colored mucus drainage he knows to contact us for antibiotic coverage    Overall I think he should do well and does not need any tympanic membrane tubes at this time

## 2023-01-25 NOTE — PROGRESS NOTES
Name: Shay       : 1980      MRN: [id number]  Encounter Provider: William Pittman MD  Encounter Date: 2023   Encounter department: [id number]     1  Eustachian tube disorder, bilateral  Assessment & Plan:  On examination today the patient's previous tympanic membrane bulge has resolved  The tympanic membranes on both sides appear to be normal in appearance  Nasal examination reveals no significant nasal membrane swelling  I recommend the patient can continue on the fluticasone medicine 1 puff in each nostril twice a day for the next several months until   If he has relapse of symptoms he can add 3 days of Afrin spray  If he develops any colored mucus drainage he knows to contact us for antibiotic coverage  Overall I think he should do well and does not need any tympanic membrane tubes at this time      2  Dysfunction of left eustachian tube  -     fluticasone (FLONASE) 50 mcg/act nasal spray; 1 puff each nostril twice a day         Subjective      This 45-year-old gentleman returns today for a follow-up assessment  He was seen previously for bilateral eustachian tube dysfunction  We placed him on a combination of Afrin for 3 days as well as inhaled nasal steroid take his own  He reports improvement in his symptoms and resolution of the tinnitus that he had at the time of his previous visit  He has no current symptoms of active infection  Review of Systems   All other systems reviewed and are negative        Current Outpatient Medications on File Prior to Visit   Medication Sig   • benzonatate (TESSALON PERLES) 100 mg capsule Take 1 capsule (100 mg total) by mouth 3 (three) times a day as needed for cough   • betamethasone, augmented, (DIPROLENE-AF) 0 05 % cream    • clotrimazole-betamethasone (LOTRISONE) 1-0 05 % lotion Apply topically 2 (two) times a day   • cyanocobalamin (VITAMIN B-12) 100 mcg tablet Take by mouth daily   • cyclobenzaprine (FLEXERIL) 5 mg tablet Take 1 tablet (5 mg total) by mouth daily at bedtime   • cyclobenzaprine (FLEXERIL) 5 mg tablet Take 1 tablet (5 mg total) by mouth daily at bedtime   • gabapentin (NEURONTIN) 100 mg capsule Take 1 capsule (100 mg total) by mouth daily at bedtime   • multivitamin (THERAGRAN) TABS Take 1 tablet by mouth daily   • naproxen (NAPROSYN) 500 mg tablet Take 1 tablet (500 mg total) by mouth 2 (two) times a day with meals   • nystatin (MYCOSTATIN) powder Apply topically 2 (two) times a day   • Specialty Vitamins Products (magnesium, amino acid chelate,) 133 MG tablet Take 1 tablet by mouth 2 (two) times a day   • [DISCONTINUED] fluticasone (FLONASE) 50 mcg/act nasal spray 1 puff each nostril twice a day   • [DISCONTINUED] methylPREDNISolone 4 MG tablet therapy pack Use as directed on package       Objective     Pulse 88   Temp (!) 96 9 °F (36 1 °C)   Ht 5' 11" (1 803 m)   Wt 107 kg (236 lb)   SpO2 98%   BMI 32 92 kg/m²     Physical Exam  HENT:      Right Ear: Tympanic membrane, ear canal and external ear normal       Left Ear: Tympanic membrane, ear canal and external ear normal       Nose: Nose normal       Mouth/Throat:      Mouth: Mucous membranes are moist    Neck:      Vascular: No carotid bruit  Musculoskeletal:      Cervical back: Normal range of motion and neck supple  No rigidity or tenderness  Lymphadenopathy:      Cervical: No cervical adenopathy         Sarah Crain MD

## 2023-02-28 ENCOUNTER — OFFICE VISIT (OUTPATIENT)
Dept: PODIATRY | Facility: CLINIC | Age: 43
End: 2023-02-28

## 2023-02-28 VITALS
HEART RATE: 94 BPM | WEIGHT: 237 LBS | BODY MASS INDEX: 33.18 KG/M2 | HEIGHT: 71 IN | DIASTOLIC BLOOD PRESSURE: 79 MMHG | SYSTOLIC BLOOD PRESSURE: 128 MMHG

## 2023-02-28 DIAGNOSIS — L60.3 NAIL DYSTROPHY: Primary | ICD-10-CM

## 2023-03-13 ENCOUNTER — TELEPHONE (OUTPATIENT)
Dept: PAIN MEDICINE | Facility: CLINIC | Age: 43
End: 2023-03-13

## 2023-03-13 NOTE — TELEPHONE ENCOUNTER
Caller: Patient    Doctor/Office: Arleen Galvan / Melisa Kothari [phone]    Location of pain: L4 and L5 Disc and pushing through down the leg again    Pain scale: 7-8/10    Duration of pain: pain is worse when on recliner and laying down  Pt has to lay flay on the floor  Pt would like to know if [facility] can put in a order for another injection? Pt's last injection was 1/2023 and it the injection worked well

## 2023-03-13 NOTE — TELEPHONE ENCOUNTER
S/w pt  Pt had an L5-S1 LESI on 1/10/23 and is requesting to repeat this procedure  Pt was advised that these procedures are normally done every 3 months if needed  Per pt he had 100% relief which lasted 6 weeks, his pain is the same and his current pain level is 7-8/10  Please advise- Is it ok for pt to repeat LESI? If yes please forward to procedure    Thank you

## 2023-03-14 NOTE — TELEPHONE ENCOUNTER
Scheduled patient for LESI on 4/11/23  Patient denies RX blood thinners/ NSAIDS  Nothing to eat or drink 1 hour prior to procedure  Needs to arrange transportation  Proper clothing for procedure  No vaccines 2 weeks prior or after procedure  If ill or place on antibiotics, please call to reschedule

## 2023-05-20 DIAGNOSIS — B36.9 FUNGAL SKIN INFECTION: ICD-10-CM

## 2023-05-20 RX ORDER — CLOTRIMAZOLE AND BETAMETHASONE DIPROPIONATE 10; .5 MG/ML; MG/ML
LOTION TOPICAL 2 TIMES DAILY
Qty: 30 ML | Refills: 2 | Status: SHIPPED | OUTPATIENT
Start: 2023-05-20

## 2023-05-23 ENCOUNTER — OFFICE VISIT (OUTPATIENT)
Dept: PODIATRY | Facility: CLINIC | Age: 43
End: 2023-05-23

## 2023-05-23 VITALS
HEART RATE: 63 BPM | WEIGHT: 238.6 LBS | HEIGHT: 71 IN | DIASTOLIC BLOOD PRESSURE: 81 MMHG | SYSTOLIC BLOOD PRESSURE: 126 MMHG | BODY MASS INDEX: 33.4 KG/M2

## 2023-05-23 DIAGNOSIS — L60.3 NAIL DYSTROPHY: Primary | ICD-10-CM

## 2023-05-23 NOTE — PROGRESS NOTES
Patient presents for palliative toenail care  No acute disorder noted  Pedal pulses are palpable  All toenails are elongated  Treatment consists of nail trimming

## 2023-06-07 DIAGNOSIS — J02.9 PHARYNGITIS, UNSPECIFIED ETIOLOGY: ICD-10-CM

## 2023-06-07 RX ORDER — AZITHROMYCIN 250 MG/1
TABLET, FILM COATED ORAL
Qty: 6 TABLET | Refills: 0 | Status: SHIPPED | OUTPATIENT
Start: 2023-06-07 | End: 2023-06-12

## 2023-06-27 ENCOUNTER — OFFICE VISIT (OUTPATIENT)
Dept: PODIATRY | Facility: CLINIC | Age: 43
End: 2023-06-27

## 2023-06-27 VITALS
DIASTOLIC BLOOD PRESSURE: 80 MMHG | HEIGHT: 71 IN | SYSTOLIC BLOOD PRESSURE: 119 MMHG | HEART RATE: 73 BPM | RESPIRATION RATE: 18 BRPM | BODY MASS INDEX: 33.28 KG/M2

## 2023-06-27 DIAGNOSIS — L60.3 NAIL DYSTROPHY: Primary | ICD-10-CM

## 2023-06-27 PROCEDURE — NCFTCARE PR NON-COVERED FOOT CARE: Performed by: PODIATRIST

## 2023-06-27 NOTE — PROGRESS NOTES
Patient presents for palliative foot care  No acute disorder noted  Pedal pulses are within normal limits  Treatment consists of nail trimming

## 2023-07-05 DIAGNOSIS — H93.19 TINNITUS, UNSPECIFIED LATERALITY: Primary | ICD-10-CM

## 2023-08-08 ENCOUNTER — OFFICE VISIT (OUTPATIENT)
Dept: PODIATRY | Facility: CLINIC | Age: 43
End: 2023-08-08

## 2023-08-08 VITALS
DIASTOLIC BLOOD PRESSURE: 88 MMHG | HEIGHT: 71 IN | SYSTOLIC BLOOD PRESSURE: 132 MMHG | BODY MASS INDEX: 32.9 KG/M2 | HEART RATE: 8 BPM | WEIGHT: 235 LBS | RESPIRATION RATE: 18 BRPM

## 2023-08-08 DIAGNOSIS — L60.3 NAIL DYSTROPHY: Primary | ICD-10-CM

## 2023-08-08 PROCEDURE — NCFTCARE PR NON-COVERED FOOT CARE: Performed by: PODIATRIST

## 2023-08-08 NOTE — PROGRESS NOTES
Patient presents for palliative toenail care. Right great toenail is markedly dystrophic. It is not painful but cosmetically displeasing. Discussed treatment options recommending total matrixectomy if patient desires the nail removed. Patient to consider. Treatment consisted of nail trimming today.

## 2023-09-19 ENCOUNTER — OFFICE VISIT (OUTPATIENT)
Dept: PODIATRY | Facility: CLINIC | Age: 43
End: 2023-09-19

## 2023-09-19 VITALS
RESPIRATION RATE: 18 BRPM | SYSTOLIC BLOOD PRESSURE: 129 MMHG | HEIGHT: 71 IN | BODY MASS INDEX: 32.78 KG/M2 | HEART RATE: 84 BPM | DIASTOLIC BLOOD PRESSURE: 83 MMHG

## 2023-09-19 DIAGNOSIS — L60.3 NAIL DYSTROPHY: Primary | ICD-10-CM

## 2023-09-19 PROCEDURE — NCFTCARE PR NON-COVERED FOOT CARE: Performed by: PODIATRIST

## 2023-09-19 NOTE — PROGRESS NOTES
Patient presents for palliative foot care. No acute disorder noted. Pedal pulses are within normal limits. Treatment consists of nail trimming.

## 2023-10-15 DIAGNOSIS — J01.00 ACUTE NON-RECURRENT MAXILLARY SINUSITIS: Primary | ICD-10-CM

## 2023-10-15 RX ORDER — AZITHROMYCIN 250 MG/1
TABLET, FILM COATED ORAL
Qty: 6 TABLET | Refills: 0 | Status: SHIPPED | OUTPATIENT
Start: 2023-10-15 | End: 2023-10-20

## 2023-11-14 ENCOUNTER — OFFICE VISIT (OUTPATIENT)
Dept: PODIATRY | Facility: CLINIC | Age: 43
End: 2023-11-14

## 2023-11-14 VITALS
HEART RATE: 81 BPM | SYSTOLIC BLOOD PRESSURE: 140 MMHG | HEIGHT: 71 IN | RESPIRATION RATE: 18 BRPM | DIASTOLIC BLOOD PRESSURE: 85 MMHG | BODY MASS INDEX: 32.78 KG/M2

## 2023-11-14 DIAGNOSIS — L60.3 NAIL DYSTROPHY: Primary | ICD-10-CM

## 2023-11-14 PROCEDURE — NCFTCARE PR NON-COVERED FOOT CARE: Performed by: PODIATRIST

## 2023-11-14 NOTE — PROGRESS NOTES
Patient presents for palliative foot care. No acute disorder noted. Pedal pulses are palpable. Treatment consists of nail trimming.

## 2023-12-26 ENCOUNTER — OFFICE VISIT (OUTPATIENT)
Dept: PODIATRY | Facility: CLINIC | Age: 43
End: 2023-12-26

## 2023-12-26 VITALS
WEIGHT: 247 LBS | DIASTOLIC BLOOD PRESSURE: 85 MMHG | RESPIRATION RATE: 18 BRPM | HEART RATE: 76 BPM | HEIGHT: 71 IN | SYSTOLIC BLOOD PRESSURE: 127 MMHG | BODY MASS INDEX: 34.58 KG/M2

## 2023-12-26 DIAGNOSIS — L60.3 NAIL DYSTROPHY: Primary | ICD-10-CM

## 2023-12-26 PROCEDURE — NCFTCARE PR NON-COVERED FOOT CARE: Performed by: PODIATRIST

## 2023-12-26 NOTE — PROGRESS NOTES
Patient presents for palliative footcare.  No acute disorder noted.  Pedal pulses are within normal limits.  Treatment consisted of nail trimming.

## 2024-02-21 PROBLEM — Z00.00 HEALTH CARE MAINTENANCE: Status: RESOLVED | Noted: 2020-09-25 | Resolved: 2024-02-21

## 2024-04-09 ENCOUNTER — OFFICE VISIT (OUTPATIENT)
Dept: PODIATRY | Facility: CLINIC | Age: 44
End: 2024-04-09

## 2024-04-09 VITALS — BODY MASS INDEX: 34.45 KG/M2 | RESPIRATION RATE: 18 BRPM | HEIGHT: 71 IN

## 2024-04-09 DIAGNOSIS — L60.3 NAIL DYSTROPHY: Primary | ICD-10-CM

## 2024-04-09 PROCEDURE — NCFTCARE PR NON-COVERED FOOT CARE: Performed by: PODIATRIST

## 2024-04-09 NOTE — PROGRESS NOTES
Patient presents for palliative footcare.  No acute disorder noted.  Pedal pulses are palpable.  Treatment consists of trimming of elongated toenails.

## 2024-04-10 DIAGNOSIS — B36.9 FUNGAL SKIN INFECTION: ICD-10-CM

## 2024-04-10 RX ORDER — CLOTRIMAZOLE AND BETAMETHASONE DIPROPIONATE 10; .5 MG/ML; MG/ML
LOTION TOPICAL 2 TIMES DAILY
Qty: 30 ML | Refills: 2 | Status: SHIPPED | OUTPATIENT
Start: 2024-04-10

## 2024-07-02 ENCOUNTER — OFFICE VISIT (OUTPATIENT)
Dept: PODIATRY | Facility: CLINIC | Age: 44
End: 2024-07-02

## 2024-07-02 VITALS — RESPIRATION RATE: 18 BRPM | BODY MASS INDEX: 34.45 KG/M2 | HEIGHT: 71 IN

## 2024-07-02 DIAGNOSIS — L60.3 NAIL DYSTROPHY: Primary | ICD-10-CM

## 2024-07-02 PROCEDURE — NCFTCARE PR NON-COVERED FOOT CARE: Performed by: PODIATRIST

## 2024-07-02 NOTE — PROGRESS NOTES
Patient presents for palliative footcare.  No acute disorder noted.  Treatment consisted of nail trimming.  Reappoint 7 weeks.

## 2024-08-20 ENCOUNTER — OFFICE VISIT (OUTPATIENT)
Dept: PODIATRY | Facility: CLINIC | Age: 44
End: 2024-08-20

## 2024-08-20 VITALS
SYSTOLIC BLOOD PRESSURE: 145 MMHG | HEART RATE: 74 BPM | HEIGHT: 71 IN | RESPIRATION RATE: 18 BRPM | BODY MASS INDEX: 34.58 KG/M2 | WEIGHT: 247 LBS | DIASTOLIC BLOOD PRESSURE: 91 MMHG

## 2024-08-20 DIAGNOSIS — L60.3 NAIL DYSTROPHY: Primary | ICD-10-CM

## 2024-08-20 PROCEDURE — NCFTCARE PR NON-COVERED FOOT CARE: Performed by: PODIATRIST

## 2024-10-08 ENCOUNTER — OFFICE VISIT (OUTPATIENT)
Dept: PODIATRY | Facility: CLINIC | Age: 44
End: 2024-10-08

## 2024-10-08 DIAGNOSIS — L60.3 NAIL DYSTROPHY: Primary | ICD-10-CM

## 2024-10-08 PROCEDURE — NCFTCARE PR NON-COVERED FOOT CARE: Performed by: PODIATRIST

## 2024-11-19 ENCOUNTER — OFFICE VISIT (OUTPATIENT)
Dept: PODIATRY | Facility: CLINIC | Age: 44
End: 2024-11-19

## 2024-11-19 DIAGNOSIS — L60.3 NAIL DYSTROPHY: Primary | ICD-10-CM

## 2024-11-19 PROCEDURE — NCFTCARE PR NON-COVERED FOOT CARE: Performed by: PODIATRIST

## 2024-11-19 NOTE — PROGRESS NOTES
Patient presents for palliative nail care.  No acute disorder noted.  Pedal pulses are palpable.  Treatment consists of nail trimming.

## 2024-12-05 DIAGNOSIS — J06.9 URTI (ACUTE UPPER RESPIRATORY INFECTION): Primary | ICD-10-CM

## 2024-12-05 RX ORDER — AZITHROMYCIN 250 MG/1
TABLET, FILM COATED ORAL
Qty: 6 TABLET | Refills: 0 | Status: SHIPPED | OUTPATIENT
Start: 2024-12-05 | End: 2024-12-10

## 2025-01-07 ENCOUNTER — OFFICE VISIT (OUTPATIENT)
Dept: PODIATRY | Facility: CLINIC | Age: 45
End: 2025-01-07

## 2025-01-07 VITALS — HEIGHT: 71 IN | WEIGHT: 235 LBS | RESPIRATION RATE: 18 BRPM | BODY MASS INDEX: 32.9 KG/M2

## 2025-01-07 DIAGNOSIS — L60.3 NAIL DYSTROPHY: Primary | ICD-10-CM

## 2025-01-07 PROCEDURE — NCFTCARE PR NON-COVERED FOOT CARE: Performed by: PODIATRIST

## 2025-01-09 DIAGNOSIS — J01.00 ACUTE NON-RECURRENT MAXILLARY SINUSITIS: Primary | ICD-10-CM

## 2025-01-09 RX ORDER — AZITHROMYCIN 250 MG/1
TABLET, FILM COATED ORAL
Qty: 6 TABLET | Refills: 0 | Status: SHIPPED | OUTPATIENT
Start: 2025-01-09 | End: 2025-01-14

## 2025-01-24 DIAGNOSIS — L30.9 DERMATITIS: ICD-10-CM

## 2025-01-24 RX ORDER — METHYLPREDNISOLONE 4 MG/1
TABLET ORAL
Qty: 21 TABLET | Refills: 0 | Status: SHIPPED | OUTPATIENT
Start: 2025-01-24

## 2025-01-29 DIAGNOSIS — H93.8X1 CONGESTION OF RIGHT EAR: Primary | ICD-10-CM

## 2025-02-25 ENCOUNTER — OFFICE VISIT (OUTPATIENT)
Dept: PODIATRY | Facility: CLINIC | Age: 45
End: 2025-02-25

## 2025-02-25 VITALS — WEIGHT: 233 LBS | BODY MASS INDEX: 32.62 KG/M2 | HEIGHT: 71 IN

## 2025-02-25 DIAGNOSIS — L60.3 NAIL DYSTROPHY: Primary | ICD-10-CM

## 2025-02-25 PROCEDURE — NCFTCARE PR NON-COVERED FOOT CARE: Performed by: PODIATRIST

## 2025-02-25 NOTE — PROGRESS NOTES
Patient presents for palliative footcare.  Pedal pulses are within normal limits.  No acute disorder noted.  Treatment consists of nail trimming.

## 2025-03-06 DIAGNOSIS — B36.9 FUNGAL SKIN INFECTION: ICD-10-CM

## 2025-03-06 RX ORDER — CLOTRIMAZOLE AND BETAMETHASONE DIPROPIONATE 10; .5 MG/ML; MG/ML
LOTION TOPICAL 2 TIMES DAILY
Qty: 30 ML | Refills: 2 | Status: SHIPPED | OUTPATIENT
Start: 2025-03-06

## 2025-04-15 ENCOUNTER — PROCEDURE VISIT (OUTPATIENT)
Dept: PODIATRY | Facility: CLINIC | Age: 45
End: 2025-04-15

## 2025-04-15 DIAGNOSIS — L60.3 NAIL DYSTROPHY: Primary | ICD-10-CM

## 2025-04-15 PROCEDURE — NCFTCARE PR NON-COVERED FOOT CARE: Performed by: PODIATRIST

## 2025-07-08 ENCOUNTER — PROCEDURE VISIT (OUTPATIENT)
Dept: PODIATRY | Facility: CLINIC | Age: 45
End: 2025-07-08

## 2025-07-08 DIAGNOSIS — L60.3 NAIL DYSTROPHY: Primary | ICD-10-CM

## 2025-07-08 PROCEDURE — NCFTCARE PR NON-COVERED FOOT CARE: Performed by: PODIATRIST

## 2025-07-08 NOTE — PROGRESS NOTES
Name: Joe Rogers      : 1980      MRN: [id number]  Encounter Provider: Nasir Garcia DPM  Encounter Date: 2025   Encounter department: [facility] [place]    Treatment consists of nail trimming.  :  Assessment & Plan  Nail dystrophy  Nail trimming           History of Present Illness   HPI  Joe Rogers is a 44 y.o. male who presents for palliative footcare.  All toenails are elongated and he has difficulty trimming these nails.      Review of Systems       Objective   There were no vitals taken for this visit.     Physical Exam    Cardiovascular:      Pulses: Normal pulses.     Skin:     Comments: All toenails are elongated.

## 2025-08-19 ENCOUNTER — PROCEDURE VISIT (OUTPATIENT)
Dept: PODIATRY | Facility: CLINIC | Age: 45
End: 2025-08-19

## 2025-08-19 DIAGNOSIS — L60.3 NAIL DYSTROPHY: Primary | ICD-10-CM

## 2025-08-19 PROCEDURE — NCFTCARE PR NON-COVERED FOOT CARE: Performed by: PODIATRIST

## (undated) DEVICE — SYRINGE 3ML LL

## (undated) DEVICE — PLASTIC ADHESIVE BANDAGE: Brand: CURITY

## (undated) DEVICE — CHLORASCRUB PREP 1ML

## (undated) DEVICE — GLOVE SRG BIOGEL ECLIPSE 6.5

## (undated) DEVICE — IV EXTENSION TUBING SMALL BORE

## (undated) DEVICE — TRAY EPIDURAL PERIFIX 20GA X 3.5IN TUOHY 8ML

## (undated) DEVICE — SYRINGE 5ML LL